# Patient Record
Sex: FEMALE | Race: WHITE | Employment: FULL TIME | URBAN - METROPOLITAN AREA
[De-identification: names, ages, dates, MRNs, and addresses within clinical notes are randomized per-mention and may not be internally consistent; named-entity substitution may affect disease eponyms.]

---

## 2018-02-15 LAB
ANTIBODY SCREEN, EXTERNAL: NEGATIVE
CHLAMYDIA, EXTERNAL: NEGATIVE
HBSAG, EXTERNAL: NEGATIVE
HIV, EXTERNAL: NON REACTIVE
N. GONORRHEA, EXTERNAL: NEGATIVE
RUBELLA, EXTERNAL: NORMAL
TYPE, ABO & RH, EXTERNAL: NORMAL

## 2018-07-06 LAB — T. PALLIDUM, EXTERNAL: NEGATIVE

## 2018-08-27 LAB — GRBS, EXTERNAL: POSITIVE

## 2018-09-22 ENCOUNTER — HOSPITAL ENCOUNTER (INPATIENT)
Age: 33
LOS: 2 days | Discharge: HOME | End: 2018-09-24
Attending: OBSTETRICS & GYNECOLOGY | Admitting: OBSTETRICS & GYNECOLOGY
Payer: COMMERCIAL

## 2018-09-22 ENCOUNTER — ANESTHESIA (OUTPATIENT)
Dept: LABOR AND DELIVERY | Age: 33
End: 2018-09-22
Payer: COMMERCIAL

## 2018-09-22 ENCOUNTER — ANESTHESIA EVENT (OUTPATIENT)
Dept: LABOR AND DELIVERY | Age: 33
End: 2018-09-22
Payer: COMMERCIAL

## 2018-09-22 PROBLEM — Z37.9 NORMAL LABOR: Status: ACTIVE | Noted: 2018-09-22

## 2018-09-22 LAB
ERYTHROCYTE [DISTWIDTH] IN BLOOD BY AUTOMATED COUNT: 13.5 % (ref 11.5–14.5)
HCT VFR BLD AUTO: 33.1 % (ref 35–47)
HGB BLD-MCNC: 11.1 G/DL (ref 11.5–16)
MCH RBC QN AUTO: 30 PG (ref 26–34)
MCHC RBC AUTO-ENTMCNC: 33.5 G/DL (ref 30–36.5)
MCV RBC AUTO: 89.5 FL (ref 80–99)
NRBC # BLD: 0 K/UL (ref 0–0.01)
NRBC BLD-RTO: 0 PER 100 WBC
PLATELET # BLD AUTO: 314 K/UL (ref 150–400)
PMV BLD AUTO: 9.8 FL (ref 8.9–12.9)
RBC # BLD AUTO: 3.7 M/UL (ref 3.8–5.2)
WBC # BLD AUTO: 15.3 K/UL (ref 3.6–11)

## 2018-09-22 PROCEDURE — 65410000002 HC RM PRIVATE OB

## 2018-09-22 PROCEDURE — 77030011943

## 2018-09-22 PROCEDURE — 75410000000 HC DELIVERY VAGINAL/SINGLE

## 2018-09-22 PROCEDURE — 74011000250 HC RX REV CODE- 250

## 2018-09-22 PROCEDURE — 74011250636 HC RX REV CODE- 250/636: Performed by: ANESTHESIOLOGY

## 2018-09-22 PROCEDURE — 36415 COLL VENOUS BLD VENIPUNCTURE: CPT | Performed by: OBSTETRICS & GYNECOLOGY

## 2018-09-22 PROCEDURE — 10907ZC DRAINAGE OF AMNIOTIC FLUID, THERAPEUTIC FROM PRODUCTS OF CONCEPTION, VIA NATURAL OR ARTIFICIAL OPENING: ICD-10-PCS | Performed by: ADVANCED PRACTICE MIDWIFE

## 2018-09-22 PROCEDURE — 74011250636 HC RX REV CODE- 250/636

## 2018-09-22 PROCEDURE — 99283 EMERGENCY DEPT VISIT LOW MDM: CPT

## 2018-09-22 PROCEDURE — 75410000003 HC RECOV DEL/VAG/CSECN EA 0.5 HR

## 2018-09-22 PROCEDURE — 85027 COMPLETE CBC AUTOMATED: CPT | Performed by: OBSTETRICS & GYNECOLOGY

## 2018-09-22 PROCEDURE — 74011250636 HC RX REV CODE- 250/636: Performed by: OBSTETRICS & GYNECOLOGY

## 2018-09-22 PROCEDURE — 74011000258 HC RX REV CODE- 258: Performed by: OBSTETRICS & GYNECOLOGY

## 2018-09-22 PROCEDURE — 51701 INSERT BLADDER CATHETER: CPT

## 2018-09-22 PROCEDURE — 74011250637 HC RX REV CODE- 250/637: Performed by: ADVANCED PRACTICE MIDWIFE

## 2018-09-22 PROCEDURE — 76060000078 HC EPIDURAL ANESTHESIA

## 2018-09-22 PROCEDURE — A4300 CATH IMPL VASC ACCESS PORTAL: HCPCS

## 2018-09-22 PROCEDURE — 77030014125 HC TY EPDRL BBMI -B: Performed by: ANESTHESIOLOGY

## 2018-09-22 PROCEDURE — 75410000002 HC LABOR FEE PER 1 HR

## 2018-09-22 RX ORDER — MINERAL OIL
OIL (ML) ORAL
Status: DISPENSED
Start: 2018-09-22 | End: 2018-09-23

## 2018-09-22 RX ORDER — BUPIVACAINE HYDROCHLORIDE 5 MG/ML
INJECTION, SOLUTION EPIDURAL; INTRACAUDAL AS NEEDED
Status: DISCONTINUED | OUTPATIENT
Start: 2018-09-22 | End: 2018-09-22 | Stop reason: HOSPADM

## 2018-09-22 RX ORDER — FENTANYL/BUPIVACAINE/NS/PF 2-1250MCG
1-16 PREFILLED PUMP RESERVOIR EPIDURAL CONTINUOUS
Status: DISCONTINUED | OUTPATIENT
Start: 2018-09-22 | End: 2018-09-24 | Stop reason: HOSPADM

## 2018-09-22 RX ORDER — EPHEDRINE SULFATE 50 MG/ML
10 INJECTION, SOLUTION INTRAVENOUS
Status: DISCONTINUED | OUTPATIENT
Start: 2018-09-22 | End: 2018-09-22 | Stop reason: HOSPADM

## 2018-09-22 RX ORDER — ONDANSETRON 2 MG/ML
4 INJECTION INTRAMUSCULAR; INTRAVENOUS
Status: DISCONTINUED | OUTPATIENT
Start: 2018-09-22 | End: 2018-09-22 | Stop reason: HOSPADM

## 2018-09-22 RX ORDER — IBUPROFEN 400 MG/1
800 TABLET ORAL EVERY 8 HOURS
Status: DISCONTINUED | OUTPATIENT
Start: 2018-09-22 | End: 2018-09-24 | Stop reason: HOSPADM

## 2018-09-22 RX ORDER — LIDOCAINE HYDROCHLORIDE AND EPINEPHRINE 15; 5 MG/ML; UG/ML
INJECTION, SOLUTION EPIDURAL AS NEEDED
Status: DISCONTINUED | OUTPATIENT
Start: 2018-09-22 | End: 2018-09-22 | Stop reason: HOSPADM

## 2018-09-22 RX ORDER — TERBUTALINE SULFATE 1 MG/ML
0.25 INJECTION SUBCUTANEOUS AS NEEDED
Status: DISCONTINUED | OUTPATIENT
Start: 2018-09-22 | End: 2018-09-22 | Stop reason: HOSPADM

## 2018-09-22 RX ORDER — BUPIVACAINE HYDROCHLORIDE 5 MG/ML
30 INJECTION, SOLUTION EPIDURAL; INTRACAUDAL AS NEEDED
Status: DISCONTINUED | OUTPATIENT
Start: 2018-09-22 | End: 2018-09-22 | Stop reason: HOSPADM

## 2018-09-22 RX ORDER — SODIUM CHLORIDE, SODIUM LACTATE, POTASSIUM CHLORIDE, CALCIUM CHLORIDE 600; 310; 30; 20 MG/100ML; MG/100ML; MG/100ML; MG/100ML
125 INJECTION, SOLUTION INTRAVENOUS CONTINUOUS
Status: DISCONTINUED | OUTPATIENT
Start: 2018-09-22 | End: 2018-09-24 | Stop reason: HOSPADM

## 2018-09-22 RX ORDER — FENTANYL CITRATE 50 UG/ML
INJECTION, SOLUTION INTRAMUSCULAR; INTRAVENOUS AS NEEDED
Status: DISCONTINUED | OUTPATIENT
Start: 2018-09-22 | End: 2018-09-22 | Stop reason: HOSPADM

## 2018-09-22 RX ORDER — FENTANYL CITRATE 50 UG/ML
100 INJECTION, SOLUTION INTRAMUSCULAR; INTRAVENOUS ONCE
Status: DISCONTINUED | OUTPATIENT
Start: 2018-09-22 | End: 2018-09-22 | Stop reason: HOSPADM

## 2018-09-22 RX ORDER — NALBUPHINE HYDROCHLORIDE 10 MG/ML
10 INJECTION, SOLUTION INTRAMUSCULAR; INTRAVENOUS; SUBCUTANEOUS
Status: DISCONTINUED | OUTPATIENT
Start: 2018-09-22 | End: 2018-09-22 | Stop reason: HOSPADM

## 2018-09-22 RX ORDER — NALOXONE HYDROCHLORIDE 0.4 MG/ML
0.4 INJECTION, SOLUTION INTRAMUSCULAR; INTRAVENOUS; SUBCUTANEOUS AS NEEDED
Status: DISCONTINUED | OUTPATIENT
Start: 2018-09-22 | End: 2018-09-22 | Stop reason: HOSPADM

## 2018-09-22 RX ORDER — SODIUM CHLORIDE 0.9 % (FLUSH) 0.9 %
SYRINGE (ML) INJECTION
Status: COMPLETED
Start: 2018-09-22 | End: 2018-09-22

## 2018-09-22 RX ORDER — OXYTOCIN/0.9 % SODIUM CHLORIDE 30/500 ML
PLASTIC BAG, INJECTION (ML) INTRAVENOUS
Status: COMPLETED
Start: 2018-09-22 | End: 2018-09-22

## 2018-09-22 RX ADMIN — OXYTOCIN: 10 INJECTION, SOLUTION INTRAMUSCULAR; INTRAVENOUS at 17:10

## 2018-09-22 RX ADMIN — Medication 10 ML/HR: at 13:38

## 2018-09-22 RX ADMIN — AMPICILLIN 2 G: 2 INJECTION, POWDER, FOR SOLUTION INTRAVENOUS at 12:50

## 2018-09-22 RX ADMIN — Medication 10 ML: at 19:25

## 2018-09-22 RX ADMIN — AMPICILLIN SODIUM 1 G: 1 INJECTION, POWDER, FOR SOLUTION INTRAMUSCULAR; INTRAVENOUS at 16:49

## 2018-09-22 RX ADMIN — IBUPROFEN 800 MG: 400 TABLET ORAL at 20:09

## 2018-09-22 RX ADMIN — BUPIVACAINE HYDROCHLORIDE 2 ML: 5 INJECTION, SOLUTION EPIDURAL; INTRACAUDAL at 13:36

## 2018-09-22 RX ADMIN — FENTANYL CITRATE 100 MCG: 50 INJECTION, SOLUTION INTRAMUSCULAR; INTRAVENOUS at 13:36

## 2018-09-22 RX ADMIN — SODIUM CHLORIDE, SODIUM LACTATE, POTASSIUM CHLORIDE, AND CALCIUM CHLORIDE 125 ML/HR: 600; 310; 30; 20 INJECTION, SOLUTION INTRAVENOUS at 13:25

## 2018-09-22 RX ADMIN — SODIUM CHLORIDE, SODIUM LACTATE, POTASSIUM CHLORIDE, AND CALCIUM CHLORIDE 999 ML/HR: 600; 310; 30; 20 INJECTION, SOLUTION INTRAVENOUS at 12:35

## 2018-09-22 RX ADMIN — LIDOCAINE HYDROCHLORIDE AND EPINEPHRINE 5 ML: 15; 5 INJECTION, SOLUTION EPIDURAL at 13:36

## 2018-09-22 NOTE — PROGRESS NOTES
1130  41 week pt of Dr Regi Ham and Leslie Navarro CNM for Dr Ricardo Franklin received to labor and delivery # 9 to rule out labor. Pt states that she started ana around 0900. Denies ROM or bloody show. Denies pregnancy complications but states she is GBS positive. Reports being 4cm in office yesterday. 1200 Dr Regi Ham notified of patient's arrival, contraction pattern and reactive fh trancing. Nurse to do vaginal exam. Pt will be under the care on [address] [place] [id number] 4.5/80/-1 
202 [facility] in to evaluate patient. To admit, start IV antibiotics and get epidural PRN 
1235 IV and labs. Bolus initiated for epidural 
1250 IV ampicillin 1233 Epidural by Dr Isaias Fuentes 1505 SVE by [address] 5/80/-1 ARM. Small amount of clear fluid noted. 1550 Straight cathed for 450 ml clear yellow urine 1645 Feeling intense rectal vaginal pressure. SVE 9/100/+1 
1655 Feeling intense urge to push. Instructed in pant blow breathing. Nurse Midwife called to bedside [address] at bedside. Pt in semifowlers starting to push. 1705  of LMI 1940 Bedside and Verbal shift change report given to CHARLES Sahni RN (oncoming nurse) by Yaa Phelps RN (offgoing nurse). Report included the following information SBAR, Kardex, Procedure Summary, Accordion and Recent Results.

## 2018-09-22 NOTE — L&D DELIVERY NOTE
Delivery Note    Obstetrician:  Umm Rivera CNM    Assistant: none    Pre-Delivery Diagnosis: Term pregnancy, Spontaneous labor, Single fetus and Uncomplicated pregnancy    Post-Delivery Diagnosis: Living  infant(s) and Male    Intrapartum Event: None    Procedure: Spontaneous vaginal delivery    Epidural: YES    Monitor:  Fetal Heart Tones - External and Uterine Contractions - External    Indications for instrumental delivery: none    Estimated Blood Loss: 150ml    Episiotomy: n/a    Laceration(s):  none    Laceration(s) repair: NO    Presentation: Cephalic    Fetal Description: thomason    Fetal Position: Occiput Anterior    Birth Weight: pending    Birth Length: pending    Apgar - One Minute: 8    Apgar - Five Minutes: 9    Umbilical Cord: Nuchal Cord x  1 and 3 vessels present    Specimens: none           Complications:  none           Cord Blood Results:   Information for the patient's :  Preethi Medrano [[id number]]   No results found for: PCTABR, PCTDIG, BILI, ABORH    Prenatal Labs:     Lab Results   Component Value Date/Time    ABO,Rh A+ 2015    HBsAg, External neg 2015    HIV, External non reactive 2015    Rubella, External immune 2015    GrBStrep, External pos 2015        Notes:  Term IUP delivery via  over intact perineum. Infant delivered in OA position and restituted to MONTRELL. Nuchal cord x1 noted, delivered through. Shoulders and body easily followed on next push. Infant placed on maternal abdomen, dried and stimulated. Spontaneous cry noted. Cord clamped and cut by FOB after pulsations ceased. Placenta delivered spontaneously, complete and intact with 3VC. Fundus firm at umbilicus, midline. Hemestasis maintained. EBL 150ml. Vagina and perineum inspected and found to be intact. Mother and baby stable. Skin to skin and BF encouraged.    Signed By:  Umm Rivera CNM     2018

## 2018-09-22 NOTE — ANESTHESIA PREPROCEDURE EVALUATION
Anesthetic History No history of anesthetic complications Review of Systems / Medical History Patient summary reviewed, nursing notes reviewed and pertinent labs reviewed Pulmonary Within defined limits Neuro/Psych Within defined limits Cardiovascular Within defined limits GI/Hepatic/Renal 
Within defined limits Endo/Other Within defined limits Other Findings Physical Exam 
 
Airway Mallampati: II 
TM Distance: > 6 cm Neck ROM: normal range of motion Mouth opening: Normal 
 
 Cardiovascular Regular rate and rhythm,  S1 and S2 normal,  no murmur, click, rub, or gallop Dental 
No notable dental hx Pulmonary Breath sounds clear to auscultation Abdominal 
GI exam deferred Other Findings Anesthetic Plan ASA: 1 Anesthesia type: epidural 
 
 
 
 
Induction: Intravenous Anesthetic plan and risks discussed with: Patient

## 2018-09-22 NOTE — IP AVS SNAPSHOT
[address] [facility] [address] [place] [address] 
[phone] Patient: Jami Llanes MRN: [id number] :1985 About your hospitalization You were admitted on:  2018 You last received care in the:  [address] [facility] You were discharged on:  2018 Why you were hospitalized Your primary diagnosis was:  Not on File Your diagnoses also included:  Normal Labor Follow-up Information Follow up With Details Comments Contact Info Selene Evans MD   [facility] [address] 
[phone] Russell Wong MD Schedule an appointment as soon as possible for a visit in 6 weeks Postpartum Follow-Up [id number] [place] [address] 7 [phone] Discharge Orders None A check oneil indicates which time of day the medication should be taken. My Medications START taking these medications Instructions Each Dose to Equal  
 Morning Noon Evening Bedtime  
 ibuprofen 800 mg tablet Commonly known as:  MOTRIN Your last dose was: Your next dose is: Take 1 Tab by mouth every eight (8) hours as needed for Pain. 800 mg CONTINUE taking these medications Instructions Each Dose to Equal  
 Morning Noon Evening Bedtime  
 [id number] nitesh-folic-dha -301 mg-mcg-mg Cmpk Your last dose was: Your next dose is: Take  by mouth. Indications: PREGNANCY  
     
   
   
   
  
 valACYclovir 500 mg tablet Commonly known as:  VALTREX Your last dose was: Your next dose is: Take 500 mg by mouth two (2) times a day. Indications: SUPPRESSION OF RECURRENT HERPES SIMPLEX INFECTION  
 500 mg Where to Get Your Medications Information on where to get these meds will be given to you by the nurse or doctor. ! Ask your nurse or doctor about these medications  
  ibuprofen 800 mg tablet Discharge Instructions POSTPARTUM DISCHARGE INSTRUCTIONS Name:  [facility] YOB: 1985 Admission Diagnosis:  Normal labor Discharge Diagnosis:   
Problem List as of 9/24/2018  Date Reviewed: 1/7/2016 Codes Class Noted - Resolved RESOLVED: Normal labor ICD-10-CM: O80, Z37.9 ICD-9-CM: 799  9/22/2018 - 9/24/2018 RESOLVED: Normal delivery ICD-10-CM: O80, Z37.9 ICD-9-CM: 494  1/7/2016 - 1/8/2016 RESOLVED: Normal pregnancy in third trimester ICD-10-CM: Z34.93 
ICD-9-CM: V22.2  1/6/2016 - 1/8/2016 Attending Physician:  Srini Jeffery MD 
 
Delivery Type:  Vaginal Childbirth: What To Expect At Home Your Recovery: Your body will slowly heal in the next few weeks. It is easy to get too tired and overwhelmed during the first weeks after your baby is born. Changes in your hormones can shift your mood without warning. You may find it hard to meet the extra demands on your energy and time. Take it easy on yourself. Follow-up care is a key part of your treatment and safety. Be sure to make and go to all appointments, and call your doctor if you are having problems. It's also a good idea to know your test results and keep a list of the medicines you take. How can you care for yourself at home? Vaginal bleeding and cramps · After delivery, you will have a bloody discharge from the vagina. This will turn pink within a week and then white or yellow after about 10 days. It may last for 2 to 4 weeks or longer, until the uterus has healed. Use pads instead of tampons until you stop bleeding. · Do not worry if you pass some blood clots, as long as they are smaller than a golf ball. If you have a tear or stitches in your vaginal area, change the pad at least every 4 hours to prevent soreness and infection. · You may have cramps for the first few days after childbirth. These are normal and occur as the uterus shrinks to normal size. Take an over-the-counter pain medicine, such as acetaminophen (Tylenol), ibuprofen (Advil, Motrin), or naproxen (Aleve), for cramps. Read and follow all instructions on the label. Do not take aspirin, because it can cause more bleeding. Do not take acetaminophen (Tylenol) and other acetaminophen containing medications (i.e. Percocet) at the same time. Breast fullness · Your breasts may overfill (engorge) in the first few days after delivery. To help milk flow and to relieve pain, warm your breasts in the shower or by using warm, moist towels before nursing. · If you are not nursing, do not put warmth on your breasts or touch your breasts. Wear a tight bra or sports bra and use ice until the fullness goes away. This usually takes 2 to 3 days. · Put ice or a cold pack on your breast after nursing to reduce swelling and pain. Put a thin cloth between the ice and your skin. Activity · Eat a balanced diet. Do not try to lose weight by cutting calories. Keep taking your prenatal vitamins, or take a multivitamin. · Get as much rest as you can. Try to take naps when your baby sleeps during the day. · Get some exercise every day. But do not do any heavy exercise until your doctor says it is okay. · Wait until you are healed (about 4 to 6 weeks) before you have sexual intercourse. Your doctor will tell you when it is okay to have sex. · Talk to your doctor about birth control. You can get pregnant even before your period returns. Also, you can get pregnant while you are breast-feeding. Mental Health · Many women get the \"baby blues\" during the first few days after childbirth. You may lose sleep, feel irritable, and cry easily. You may feel happy one minute and sad the next. Hormone changes are one cause of these emotional changes.  Also, the demands of a new baby, along with visits from relatives or other family needs, add to a mother's stress. The \"baby blues\" often peak around the fourth day. Then they ease up in less than 2 weeks. · If your moodiness or anxiety lasts for more than 2 weeks, or if you feel like life is not worth living, you may have postpartum depression. This is different for each mother. Some mothers with serious depression may worry intensely about their infant's well-being. Others may feel distant from their child. Some mothers might even feel that they might harm their baby. A mother may have signs of paranoia, wondering if someone is watching her. · With all the changes in your life, you may not know if you are depressed. Pregnancy sometimes causes changes in how you feel that are similar to the symptoms of depression. · Symptoms of depression include: · Feeling sad or hopeless and losing interest in daily activities. These are the most common symptoms of depression. · Sleeping too much or not enough. · Feeling tired. You may feel as if you have no energy. · Eating too much or too little. · POSTPARTUM SUPPORT INTERNATIONAL (PSI) offers a Warm line; Chat with the Expert phone sessions; Information and Articles about Pregnancy and Postpartum Mood Disorders; Comprehensive List of Free Support Groups; Knowledgeable local coordinators who will offer support, information, and resources; Guide to Resources on [employer]; Calendar of events in the  mood disorders community; Latest News and Research; and [facility] [address] for [employer]. Remember - You are not alone; You are not to blame; With help, you will be well. 0-714-595-PPD(1463). WWW. POSTPARTUM. NET · Writing or talking about death, such as writing suicide notes or talking about guns, knives, or pills. Keep the numbers for these national suicide hotlines: 7-203-270-TALK (3-210.190.2129) and 1-464-NREVQOZ (8-136.153.5382).  If you or someone you know talks about suicide or feeling hopeless, get help right away. Constipation and Hemorrhoids · Drink plenty of fluids, enough so that your urine is light yellow or clear like water. If you have kidney, heart, or liver disease and have to limit fluids, talk with your doctor before you increase the amount of fluids you drink. · Eat plenty of fiber each day. Have a bran muffin or bran cereal for breakfast, and try eating a piece of fruit for a mid-afternoon snack. · For painful, itchy hemorrhoids, put ice or a cold pack on the area several times a day for 10 minutes at a time. Follow this by putting a warm compress on the area for another 10 to 20 minutes or by sitting in a shallow, warm bath. When should you call for help? Call 911 anytime you think you may need emergency care. For example, call if: 
· You are thinking of hurting yourself, your baby, or anyone else. · You passed out (lost consciousness). · You have symptoms of a blood clot in your lung (called a pulmonary embolism). These may include:   
· Sudden chest pain. · Trouble breathing. · Coughing up blood. Call your doctor now or seek immediate medical care if: 
· You have severe vaginal bleeding. · You are soaking through a pad each hour for 2 or more hours. · Your vaginal bleeding seems to be getting heavier or is still bright red 4 days after delivery. · You are dizzy or lightheaded, or you feel like you may faint. · You are vomiting or cannot keep fluids down. · You have a fever. · You have new or more belly pain. · You pass tissue (not just blood). · Your vaginal discharge smells bad. · Your belly feels tender or full and hard. · Your breasts are continuously painful or red. · You feel sad, anxious, or hopeless for more than a few days. · You have sudden, severe pain in your belly. · You have symptoms of a blood clot in your leg (called a deep vein thrombosis),  
       such as: 
· Pain in your calf, back of the knee, thigh, or groin. · Redness and swelling in your leg or groin. · You have symptoms of preeclampsia, such as: 
· Sudden swelling of your face, hands, or feet. · New vision problems (such as dimness or blurring). · A severe headache. · Your blood pressure is higher than it should be or rises suddenly. · You have new nausea or vomiting. Watch closely for changes in your health, and be sure to contact your doctor if you have any problems. Additional Information:  Not applicable These are general instructions for a healthy lifestyle: No smoking/ No tobacco products/ Avoid exposure to second hand smoke Surgeon General's Warning:  Quitting smoking now greatly reduces serious risk to your health. Obesity, smoking, and sedentary lifestyle greatly increases your risk for illness A healthy diet, regular physical exercise & weight monitoring are important for maintaining a healthy lifestyle Recognize signs and symptoms of STROKE: 
 
F-face looks uneven A-arms unable to move or move unevenly S-speech slurred or non-existent T-time-call 911 as soon as signs and symptoms begin - DO NOT go  
    back to bed or wait to see if you get better - TIME IS BRAIN. I have had the opportunity to make my options or choices for discharge. I have received and understand these instructions. After Your Delivery (the Postpartum Period): Care Instructions Your Care Instructions Congratulations on the birth of your baby. Like pregnancy, the  period can be a time of excitement, agnieszka, and exhaustion. You may look at your wondrous little baby and feel happy. You may also be overwhelmed by your new sleep hours and new responsibilities. At first, babies often sleep during the days and are awake at night. They do not have a pattern or routine. They may make sudden gasps, jerk themselves awake, or look like they have crossed eyes. These are all normal, and they may even make you smile. In these first weeks after delivery, try to take good care of yourself. It may take 4 to 6 weeks to feel like yourself again, and possibly longer if you had a  birth. You will likely feel very tired for several weeks. Your days will be full of ups and downs, but lots of agnieszka as well. Follow-up care is a key part of your treatment and safety. Be sure to make and go to all appointments, and call your doctor if you are having problems. It's also a good idea to know your test results and keep a list of the medicines you take. How can you care for yourself at home? Take care of your body after delivery · Use pads instead of tampons for the bloody flow that may last as long as 2 weeks. · Ease cramps with ibuprofen (Advil, Motrin). · Ease soreness of hemorrhoids and the area between your vagina and rectum with ice compresses or witch hazel pads. · Ease constipation by drinking lots of fluid and eating high-fiber foods. Ask your doctor about over-the-counter stool softeners. · Cleanse yourself with a gentle squeeze of warm water from a bottle instead of wiping with toilet paper. · Take a sitz bath in warm water several times a day. · Wear a good nursing bra. Ease sore and swollen breasts with warm, wet washcloths. · If you are not breastfeeding, use ice rather than heat for breast soreness. · Your period may not start for several months if you are breastfeeding. You may bleed more, and longer at first, than you did before you got pregnant. · Wait until you are healed (about 4 to 6 weeks) before you have sexual intercourse. Your doctor will tell you when it is okay to have sex. · Try not to travel with your baby for 5 or 6 weeks. If you take a long car trip, make frequent stops to walk around and stretch. Avoid exhaustion · Rest every day. Try to nap when your baby naps. · Ask another adult to be with you for a few days after delivery. · Plan for  if you have other children. · Stay flexible so you can eat at odd hours and sleep when you need to. Both you and your baby are making new schedules. · Plan small trips to get out of the house. Change can make you feel less tired. · Ask for help with housework, cooking, and shopping. Remind yourself that your job is to care for your baby. Know about help for postpartum depression · \"Baby blues\" are common for the first 1 to 2 weeks after birth. You may cry or feel sad or irritable for no reason. · Rest whenever you can. Being tired makes it harder to handle your emotions. · Go for walks with your baby. · Talk to your partner, friends, and family about your feelings. · If your symptoms last for more than a few weeks, or if you feel very depressed, ask your doctor for help. · Postpartum depression can be treated. Support groups and counseling can help. Sometimes medicine can also help. Stay healthy · Eat healthy foods so you have more energy, make good breast milk, and lose extra baby pounds. · If you breastfeed, avoid alcohol and drugs. If you quit smoking during pregnancy, try to stay smoke-free. · Start daily exercise after 4 to 6 weeks, but rest when you feel tired. · Learn exercises to tone your belly. Do Kegel exercises to regain strength in your pelvic muscles. You can do these exercises while you stand or sit. ¨ Squeeze the same muscles you would use to stop your urine. Your belly and thighs should not move. ¨ Hold the squeeze for 3 seconds, and then relax for 3 seconds. ¨ Start with 3 seconds. Then add 1 second each week until you are able to squeeze for 10 seconds. ¨ Repeat the exercise 10 to 15 times for each session. Do three or more sessions each day. · Find a class for new mothers and new babies that has an exercise time. · If you had a  birth, give yourself a bit more time before you exercise, and be careful. When should you call for help? Call 911 anytime you think you may need emergency care. For example, call if: 
  · You passed out (lost consciousness).  
 Call your doctor now or seek immediate medical care if: 
  · You have severe vaginal bleeding. This means you are passing blood clots and soaking through a pad each hour for 2 or more hours.  
  · You are dizzy or lightheaded, or you feel like you may faint.  
  · You have a fever.  
  · You have new belly pain, or your pain gets worse.  
 Watch closely for changes in your health, and be sure to contact your doctor if: 
  · Your vaginal bleeding seems to be getting heavier.  
  · You have new or worse vaginal discharge.  
  · You feel sad, anxious, or hopeless for more than a few days.  
  · You do not get better as expected. Where can you learn more? Go to http://warren-malou.info/. Enter A461 in the search box to learn more about \"After Your Delivery (the Postpartum Period): Care Instructions. \" Current as of: November 21, 2017 Content Version: 11.7 © 6604-5383 Peak. Care instructions adapted under license by [employer] (which disclaims liability or warranty for this information). If you have questions about a medical condition or this instruction, always ask your healthcare professional. [address] any warranty or liability for your use of this information. Seventymm Announcement We are excited to announce that we are making your provider's discharge notes available to you in Seventymm. You will see these notes when they are completed and signed by the physician that discharged you from your recent hospital stay. If you have any questions or concerns about any information you see in Seventymm, please call the Health Information Department where you were seen or reach out to your Primary Care Provider for more information about your plan of care. Introducing [facility]! Maggy Spann introduces [employer] patient portal. Now you can access parts of your medical record, email your doctor's office, and request medication refills online. 1. In your internet browser, go to [email or web address] 2. Click on the First Time User? Click Here link in the Sign In box. You will see the New Member Sign Up page. 3. Enter your [employer] Access Code exactly as it appears below. You will not need to use this code after youve completed the sign-up process. If you do not sign up before the expiration date, you must request a new code. · [employer] Access Code: [email or web address] Expires: 12/23/2018 10:49 AM 
 
4. Enter the last four digits of your Social Security Number (xxxx) and Date of Birth (mm/dd/yyyy) as indicated and click Submit. You will be taken to the next sign-up page. 5. Create a [employer] ID. This will be your [employer] login ID and cannot be changed, so think of one that is secure and easy to remember. 6. Create a [employer] password. You can change your password at any time. 7. Enter your Password Reset Question and Answer. This can be used at a later time if you forget your password. 8. Enter your e-mail address. You will receive e-mail notification when new information is available in [address] 9. Click Sign Up. You can now view and download portions of your medical record. 10. Click the Download Summary menu link to download a portable copy of your medical information. If you have questions, please visit the Frequently Asked Questions section of the [employer] website. Remember, [employer] is NOT to be used for urgent needs. For medical emergencies, dial 911. Now available from your iPhone and Android! Introducing Arnold Wilcox As a Maggy Spann patient, I wanted to make you aware of our electronic visit tool called Arnold Wilcox. Maggy Spann 24/7 allows you to connect within minutes with a medical provider 24 hours a day, seven days a week via a mobile device or tablet or logging into a secure website from your computer. You can access MediSens from anywhere in the [place]. A virtual visit might be right for you when you have a simple condition and feel like you just dont want to get out of bed, or cant get away from work for an appointment, when your regular [facility] provider is not available (evenings, weekends or holidays), or when youre out of town and need minor care. Electronic visits cost only $49 and if the [facility] provider determines a prescription is needed to treat your condition, one can be electronically transmitted to a nearby pharmacy*. Please take a moment to enroll today if you have not already done so. The enrollment process is free and takes just a few minutes. To enroll, please download the [employer] mando to your tablet or phone, or visit [email or web address] to enroll on your computer. And, as an [address] [place] [address] patient with a [employer] account, the results of your visits will be scanned into your electronic medical record and your primary care provider will be able to view the scanned results. We urge you to continue to see your regular [facility] provider for your ongoing medical care. And while your primary care provider may not be the one available when you seek a Arnold Wilcox virtual visit, the peace of mind you get from getting a real diagnosis real time can be priceless. For more information on Arnold GameOnmayrafin, view our Frequently Asked Questions (FAQs) at [email or web address]. Sincerely, 
 
Lorenzo Lockhart MD 
Chief Medical Officer Ty Espitia *:  certain medications cannot be prescribed via Arnold GameOnedmond Providers Seen During Your Hospitalization Provider Specialty Primary office phone Fanny French MD Obstetrics & Gynecology [phone] Immunizations Administered for This Admission Name Date Tdap  Deferred () Your Primary Care Physician (PCP) Primary Care Physician Office Phone Office Fax Austyn Carson [phone] You are allergic to the following No active allergies Recent Documentation Height Weight Breastfeeding? BMI OB Status Smoking Status 1.676 m 60.3 kg Unknown 21.47 kg/m2 Recent pregnancy Never Smoker Emergency Contacts Name Discharge Info Relation Home Work Mobile [address] CAREGIVER [3] Spouse [3] [phone] [address] CAREGIVER [3] Mother [14] [phone] Patient Belongings The following personal items are in your possession at time of discharge: 
     Visual Aid: None Please provide this summary of care documentation to your next provider. Signatures-by signing, you are acknowledging that this After Visit Summary has been reviewed with you and you have received a copy. Patient Signature:  ____________________________________________________________ Date:  ____________________________________________________________  
  
[facility] Provider Signature:  ____________________________________________________________ Date:  ____________________________________________________________

## 2018-09-22 NOTE — ANESTHESIA PROCEDURE NOTES
Epidural Block Start time: 9/22/2018 1:30 PM 
End time: 9/22/2018 1:40 PM 
Performed by: SONJA Donaldson Authorized by: SONJA Donaldson Pre-Procedure Indication: labor epidural   
Preanesthetic Checklist: patient identified, risks and benefits discussed, anesthesia consent, site marked, patient being monitored, timeout performed and anesthesia consent Timeout Time: 13:30 Epidural:  
Patient position:  Left lateral decubitus Prep region:  Lumbar Prep: Chlorhexidine Location:  L2-3 Needle and Epidural Catheter:  
Needle Type:  Tuohy Needle Gauge:  17 G Injection Technique:  Loss of resistance using air Attempts:  1 Catheter Size:  19 G Events: no blood with aspiration, no cerebrospinal fluid with aspiration, no paresthesia and negative aspiration test   
Test Dose:  Negative and lidocaine 1.5% w/ epi Assessment:  
Catheter Secured:  Tegaderm and tape Insertion:  Uncomplicated Patient tolerance:  Patient tolerated the procedure well with no immediate complications

## 2018-09-22 NOTE — IP AVS SNAPSHOT
[address] [facility] 
[phone] Patient: Hawa Ennis MRN: [id number] :1985 A check oneil indicates which time of day the medication should be taken. My Medications START taking these medications Instructions Each Dose to Equal  
 Morning Noon Evening Bedtime  
 ibuprofen 800 mg tablet Commonly known as:  MOTRIN Your last dose was: Your next dose is: Take 1 Tab by mouth every eight (8) hours as needed for Pain. 800 mg CONTINUE taking these medications Instructions Each Dose to Equal  
 Morning Noon Evening Bedtime  
 [id number] nitesh-folic-dha -619 mg-mcg-mg Cmpk Your last dose was: Your next dose is: Take  by mouth. Indications: PREGNANCY  
     
   
   
   
  
 valACYclovir 500 mg tablet Commonly known as:  VALTREX Your last dose was: Your next dose is: Take 500 mg by mouth two (2) times a day. Indications: SUPPRESSION OF RECURRENT HERPES SIMPLEX INFECTION  
 500 mg Where to Get Your Medications Information on where to get these meds will be given to you by the nurse or doctor. ! Ask your nurse or doctor about these medications  
  ibuprofen 800 mg tablet

## 2018-09-22 NOTE — PROGRESS NOTES
Labor and Delivery Admission Note 2018 Patient is a 29 y.o. G1 P 0 39w6d by  LMP. EFRA 18. Pt of Dr. Hermila Shabazz. Presnets with c/o painful UCs that started this morning. Hx  x1 in 667, uncomplicated. GBS+, Hx HSV+ on valtrex. No recent outbreaks. deneis all other sig med/ob hx. GFOB at bedside. Requesting epidural.  
 
PNC: Blood type: A 
          RH: pos Hep B: negative Rubella: immune GBS status: positive OBHX:  
OB History  Para Term  AB Living 2 1 1   1 SAB TAB Ectopic Molar Multiple Live Births 0 1 PMH:  
Past Medical History:  
Diagnosis Date  Genital herpes   
 last outbreak over a year ago, on valtrex  Herpes simplex virus (HSV) infection Not during pregnancy PSH:  
Past Surgical History:  
Procedure Laterality Date  HX OTHER SURGICAL  10/2014  
 wisdom teeth  
 
 
OB/GYN: ,  x1 Meds:  
Prior to Admission Medications Prescriptions Last Dose Informant Patient Reported? Taking? PNV no.24-iron-folic acid-dha -064 mg-mcg-mg cmpk 2018 at 0800  Yes Yes Sig: Take  by mouth. Indications: PREGNANCY  
valACYclovir (VALTREX) 500 mg tablet 2018 at 0800  Yes Yes Sig: Take 500 mg by mouth two (2) times a day. Indications: SUPPRESSION OF RECURRENT HERPES SIMPLEX INFECTION Facility-Administered Medications: None Allergies: No Known Allergies Pertinent ROS: Review of Systems - History obtained from the patient General ROS: negative Psychological ROS: negative Respiratory ROS: no cough, shortness of breath, or wheezing Cardiovascular ROS: no chest pain or dyspnea on exertion Genito-Urinary ROS: no dysuria, trouble voiding, or hematuria Neurological ROS: negative for - behavioral changes, dizziness, headaches or visual changes [address]:  
Family History Problem Relation Age of Onset  Heart Disease Maternal Grandfather  Diabetes Paternal Grandmother Lung  
 Elevated Lipids Paternal Grandmother  Heart Disease Paternal Grandfather 61 SH: Social History Social History  Marital status:  Spouse name: N/A  
 Number of children: N/A  
 Years of education: N/A Occupational History  Not on file. Social History Main Topics  Smoking status: Never Smoker  Smokeless tobacco: Never Used  Alcohol use Yes Comment: Not with pregnancy  Drug use: No  
 Sexual activity: Yes  
  Partners: Male Birth control/ protection: None Other Topics Concern  Not on file Social History Narrative OBJECTIVE: 
 
Temp (24hrs), Av.2 °F (36.8 °C), Min:98.2 °F (36.8 °C), Max:98.2 °F (36.8 °C) Visit Vitals  BP 94/58  Pulse (!) 103  Temp 98.2 °F (36.8 °C)  Resp 20  
 Ht 5' 6\" (1.676 m)  Wt 60.3 kg (133 lb)  SpO2 100%  Breastfeeding No  
 BMI 21.47 kg/m2 FHR baseline 120 moderate variability, +accels, no decels [place] : 3-4 min, moderate intensity, lasting 80 sec Exam: 
General: alert HEENT:  normal  
Lungs:  clear Cor:  RRR Abdomen:  Soft, non-tender Clinical EFW 2800gms Cervix:  5/50/-1 Fluid:  intact Pelvimetry:  AP-good Arch- adequate Sidewalls- adequate Pelvis feels adequate for fetus. Extremities:  normal, no edema Impression:  at 37w11d Reassuring FWB 
                      GBS+ Plan: admit IV hydration, ampicillin Epidural 
          Anticipate  Christa Suarez CNM 
2:24 PM

## 2018-09-22 NOTE — H&P
History and Physical 
 
Patient: Qiana Vega MRN: [id number]  SSN: [id number] YOB: 1985  Age: 29 y.o. Sex: female Subjective:  
  
Qiana Vega is a 29 y.o. female who presents at 39.6 wks with c/o UCs since this morning. EFRA 18 by LMP. Receiving prenatal care with Dr. Tj Lopez at [facility]. Hx HSV with no recent outbreaks. On valtrex since 36 wks. GBS+.  x1 0052, uncomplicated. Deneis all othe rsig med/ob hx. Past Medical History:  
Diagnosis Date  Genital herpes   
 last outbreak over a year ago, on valtrex  Herpes simplex virus (HSV) infection Not during pregnancy Past Surgical History:  
Procedure Laterality Date  HX OTHER SURGICAL  10/2014  
 wisdom teeth Family History Problem Relation Age of Onset  Heart Disease Maternal Grandfather  Diabetes Paternal Grandmother Lung  Elevated Lipids Paternal Grandmother  Heart Disease Paternal Grandfather 61 Social History Substance Use Topics  Smoking status: Never Smoker  Smokeless tobacco: Never Used  Alcohol use Yes Comment: Not with pregnancy Prior to Admission medications Medication Sig Start Date End Date Taking? Authorizing Provider  
valACYclovir (VALTREX) 500 mg tablet Take 500 mg by mouth two (2) times a day. Indications: SUPPRESSION OF RECURRENT HERPES SIMPLEX INFECTION   Yes Historical Provider PNV no.24-iron-folic acid-dha -880 mg-mcg-mg cmpk Take  by mouth. Indications: PREGNANCY   Yes Historical Provider No Known Allergies Review of Systems: 
Constitutional: negative Respiratory: negative Cardiovascular: negative Genitourinary:negative Neurological: negative Behavioral/Psychiatric: negative Objective:  
 
Vitals:  
 18 1343 18 1346 18 1347 18 1349 BP: 92/50 90/50  94/58 Pulse: 88 93  (!) 103 Resp:      
Temp:      
SpO2:   100% Weight:      
Height:      
  
 
Physical Exam: GENERAL: alert, cooperative, no distress, appears stated age LUNG: clear to auscultation bilaterally HEART: regular rate and rhythm, S1, S2 normal, no murmur, click, rub or gallop ABDOMEN: soft, non-tender. Bowel sounds normal. No masses,  no organomegaly EXTREMITIES:  extremities normal, atraumatic, no cyanosis or edema, no edema NEUROLOGIC: AOx3. Gait normal. Reflexes and motor strength normal and symmetric. Cranial nerves 2-12 and sensation grossly intact. PSYCHIATRIC: non focal 
 
Assessment:  
IUP 39.6 in active labor Reassuring FWB 
GBS+ Hospital Problems  Date Reviewed: 2016 Codes Class Noted POA Normal labor ICD-10-CM: O80, Z37.9 ICD-9-CM: 986  2018 Unknown Plan:  
Admit IV hydration, ampicillin Epidural 
Anticipate  Signed By: Julian Holt CNM 2018

## 2018-09-23 PROCEDURE — 65410000002 HC RM PRIVATE OB

## 2018-09-23 PROCEDURE — 74011250637 HC RX REV CODE- 250/637: Performed by: ADVANCED PRACTICE MIDWIFE

## 2018-09-23 RX ORDER — SODIUM CHLORIDE 0.9 % (FLUSH) 0.9 %
5-10 SYRINGE (ML) INJECTION EVERY 8 HOURS
Status: DISCONTINUED | OUTPATIENT
Start: 2018-09-23 | End: 2018-09-24 | Stop reason: HOSPADM

## 2018-09-23 RX ORDER — DIPHENHYDRAMINE HCL 25 MG
25 CAPSULE ORAL
Status: DISCONTINUED | OUTPATIENT
Start: 2018-09-23 | End: 2018-09-24 | Stop reason: HOSPADM

## 2018-09-23 RX ORDER — OXYTOCIN/RINGER'S LACTATE 20/1000 ML
125-1000 PLASTIC BAG, INJECTION (ML) INTRAVENOUS AS NEEDED
Status: DISCONTINUED | OUTPATIENT
Start: 2018-09-23 | End: 2018-09-24 | Stop reason: HOSPADM

## 2018-09-23 RX ORDER — ONDANSETRON 4 MG/1
4 TABLET, ORALLY DISINTEGRATING ORAL
Status: DISCONTINUED | OUTPATIENT
Start: 2018-09-23 | End: 2018-09-24 | Stop reason: HOSPADM

## 2018-09-23 RX ORDER — OXYCODONE AND ACETAMINOPHEN 5; 325 MG/1; MG/1
1 TABLET ORAL
Status: DISCONTINUED | OUTPATIENT
Start: 2018-09-23 | End: 2018-09-24 | Stop reason: HOSPADM

## 2018-09-23 RX ORDER — SIMETHICONE 80 MG
80 TABLET,CHEWABLE ORAL
Status: DISCONTINUED | OUTPATIENT
Start: 2018-09-23 | End: 2018-09-24 | Stop reason: HOSPADM

## 2018-09-23 RX ORDER — DOCUSATE SODIUM 100 MG/1
100 CAPSULE, LIQUID FILLED ORAL
Status: DISCONTINUED | OUTPATIENT
Start: 2018-09-23 | End: 2018-09-24 | Stop reason: HOSPADM

## 2018-09-23 RX ORDER — SODIUM CHLORIDE 0.9 % (FLUSH) 0.9 %
5-10 SYRINGE (ML) INJECTION AS NEEDED
Status: DISCONTINUED | OUTPATIENT
Start: 2018-09-23 | End: 2018-09-24 | Stop reason: HOSPADM

## 2018-09-23 RX ORDER — ACETAMINOPHEN 325 MG/1
650 TABLET ORAL
Status: DISCONTINUED | OUTPATIENT
Start: 2018-09-23 | End: 2018-09-24 | Stop reason: HOSPADM

## 2018-09-23 RX ORDER — AMMONIA 15 % (W/V)
1 AMPUL (EA) INHALATION AS NEEDED
Status: DISCONTINUED | OUTPATIENT
Start: 2018-09-23 | End: 2018-09-24 | Stop reason: HOSPADM

## 2018-09-23 RX ORDER — HYDROCORTISONE 1 %
CREAM (GRAM) TOPICAL AS NEEDED
Status: DISCONTINUED | OUTPATIENT
Start: 2018-09-23 | End: 2018-09-24 | Stop reason: HOSPADM

## 2018-09-23 RX ORDER — ZOLPIDEM TARTRATE 5 MG/1
5 TABLET ORAL
Status: DISCONTINUED | OUTPATIENT
Start: 2018-09-23 | End: 2018-09-24 | Stop reason: HOSPADM

## 2018-09-23 RX ORDER — HYDROCORTISONE ACETATE PRAMOXINE HCL 2.5; 1 G/100G; G/100G
CREAM TOPICAL AS NEEDED
Status: DISCONTINUED | OUTPATIENT
Start: 2018-09-23 | End: 2018-09-24 | Stop reason: HOSPADM

## 2018-09-23 RX ADMIN — IBUPROFEN 800 MG: 400 TABLET ORAL at 20:23

## 2018-09-23 RX ADMIN — OXYCODONE HYDROCHLORIDE AND ACETAMINOPHEN 1 TABLET: 5; 325 TABLET ORAL at 14:18

## 2018-09-23 RX ADMIN — IBUPROFEN 800 MG: 400 TABLET ORAL at 04:25

## 2018-09-23 RX ADMIN — IBUPROFEN 800 MG: 400 TABLET ORAL at 12:23

## 2018-09-23 NOTE — ROUTINE PROCESS
Bedside shift change report given to ROCÍO Delgado RN (oncoming nurse) by Jermain Goldberg RN (offgoing nurse). Report included the following information SBAR, Kardex, Intake/Output, MAR and Recent Results.

## 2018-09-23 NOTE — LACTATION NOTE
This note was copied from a baby's chart. Not seen at breast, mother declines [facility] consult, expresses confidence in ability to breastfeed independently. Mother reports Baby nursing well today,  deep latch obtained, mother is comfortable, baby feeding vigorously with rhythmic suck, swallow, breathe pattern, audible swallowing, and evident milk transfer, both breasts offered, baby is asleep following feeding.

## 2018-09-23 NOTE — PROGRESS NOTES
1130 Bedside and Verbal shift change report given to TIM gatica RN (oncoming nurse) by Marty Caldwell RN (offgoing nurse). Report included the following information SBAR, Kardex, MAR and Recent Results. 1420 Verbal shift change report given to Marty Caldwell RN (oncoming nurse) by Marshall Blanton RN (offgoing nurse). Report included the following information SBAR, Kardex, Procedure Summary, MAR and Recent Results.

## 2018-09-23 NOTE — ROUTINE PROCESS
2020:  TRANSFER - IN REPORT: 
 
Verbal report received from MATT Sahni RN(name) on Duncan Jiménez  being received from L&D(unit) for routine progression of care Report consisted of patients Situation, Background, Assessment and  
Recommendations(SBAR). Information from the following report(s) SBAR was reviewed with the receiving nurse. Opportunity for questions and clarification was provided. Assessment completed upon patients arrival to unit and care assumed.

## 2018-09-23 NOTE — PROGRESS NOTES
Post-Partum Day Number 1 Progress Note [facility] Assessment: Doing well, post partum day 1 Plan: 1. Continue routine postpartum and perineal care as well as maternal education. 2. N/A -no circ Information for the patient's :  Margaret Emmanuel [[id number]] Vaginal, Spontaneous Delivery Patient doing well without significant complaint. Voiding without difficulty, normal lochia. Vitals: 
Visit Vitals  /69 (BP 1 Location: Left arm, BP Patient Position: At rest)  Pulse 91  Temp 97.7 °F (36.5 °C)  Resp 16  
 Ht 5' 6\" (1.676 m)  Wt 60.3 kg (133 lb)  SpO2 100%  Breastfeeding No  
 BMI 21.47 kg/m2 Temp (24hrs), Av.8 °F (36.6 °C), Min:97.4 °F (36.3 °C), Max:98.3 °F (36.8 °C) Exam:   Patient without distress. Abdomen soft, fundus firm, nontender Perineum with normal lochia noted. Lower extremities are negative for swelling, cords or tenderness. Labs:  
 
Lab Results Component Value Date/Time WBC 15.3 (H) 2018 12:55 PM  
 WBC 13.6 (H) 2016 06:40 AM  
 HGB 11.1 (L) 2018 12:55 PM  
 HGB 12.9 2016 06:40 AM  
 HCT 33.1 (L) 2018 12:55 PM  
 HCT 38.0 2016 06:40 AM  
 PLATELET 983  12:55 PM  
 PLATELET 800  06:40 AM  
 
 
No results found for this or any previous visit (from the past 24 hour(s)).

## 2018-09-24 VITALS
SYSTOLIC BLOOD PRESSURE: 106 MMHG | HEIGHT: 66 IN | HEART RATE: 87 BPM | OXYGEN SATURATION: 100 % | RESPIRATION RATE: 16 BRPM | WEIGHT: 133 LBS | TEMPERATURE: 98.3 F | BODY MASS INDEX: 21.38 KG/M2 | DIASTOLIC BLOOD PRESSURE: 66 MMHG

## 2018-09-24 PROBLEM — Z37.9 NORMAL LABOR: Status: RESOLVED | Noted: 2018-09-22 | Resolved: 2018-09-24

## 2018-09-24 PROCEDURE — 74011250637 HC RX REV CODE- 250/637: Performed by: ADVANCED PRACTICE MIDWIFE

## 2018-09-24 RX ORDER — IBUPROFEN 800 MG/1
800 TABLET ORAL
Qty: 60 TAB | Refills: 0 | Status: SHIPPED | OUTPATIENT
Start: 2018-09-24 | End: 2020-07-29

## 2018-09-24 RX ADMIN — IBUPROFEN 800 MG: 400 TABLET ORAL at 04:44

## 2018-09-24 RX ADMIN — IBUPROFEN 800 MG: 400 TABLET ORAL at 12:49

## 2018-09-24 NOTE — LACTATION NOTE
This note was copied from a baby's chart. Couplet Interdisciplinary Rounds MATERNAL Daily Goal:  
 
Influenza screening completed: YES  Tdap screening completed: YES Rhogam Given:N/A 
MMR Given:N/A 
 
VTE Prophylaxis: Not indicated, per Provider order EPDS:   
 
 
 Patient Name: Krupa Abreu Diagnosis:  Single liveborn, born in hospital, delivered by vaginal delivery Date of Admission: 2018 LOS: 2 Gestational Age: Gestational Age: 37w11d Daily Goal:  
 
Birth Weight: 3.595 kg Current Weight: Weight: 3.32 kg 
% of Weight Change: -8% Feeding: 
 Metabolic Screen: YES Hepatitis B:  YES Discharge Bili:  YES Car Seat Trial, if needed:  N/A Patient/Family Teaching Needs:  
 
Days before discharge: Ready for discharge In Attendance:  Nursing and Physician

## 2018-09-24 NOTE — ROUTINE PROCESS
0730: Bedside shift change report given to NITZA Lira RN (oncoming nurse) by Sharan Munoz RN (offgoing nurse). Report included the following information SBAR. 1130: Discharge instructions reviewed with pt and all questions answered. Prescription given. Follow up in 6 weeks with Dr. Ortiz. Pt to call when ready for wheelchair. 1255: Pt discharged in wheelchair by volunteers.

## 2018-09-24 NOTE — LACTATION NOTE
This note was copied from a baby's chart. Baby nursing well and has improved throughout post partum stay, deep latch maintained, mother is comfortable, milk is in transition, baby feeding vigorously with rhythmic suck, swallow, breathe pattern, with audible swallowing, and evident milk transfer, both breasts offered, baby is asleep following feeding. Baby is feeding on demand, voiding and stools present as appropriate over the last 24 hours. Infant is at a 7% weight loss.

## 2018-09-24 NOTE — ROUTINE PROCESS
Bedside shift change report given to Liz Flor RN (oncoming nurse) by JANIS Delatorre RN (offgoing nurse). Report included the following information SBAR, Kardex, Procedure Summary, Intake/Output, MAR and Recent Results.

## 2018-09-24 NOTE — DISCHARGE SUMMARY
Obstetrical Discharge Summary     Name: Nu Mike MRN: [id number]  SSN: [id number]    YOB: 1985  Age: 29 y.o. Sex: female      Admit Date: 2018    Discharge Date: 2018    Admitting Physician: Samir Chery MD     Attending Physician:  Emily Vela MD     Discharge Diagnoses:   Information for the patient's :  Elif Kenny [[id number]]   Delivery of a 3.595 kg male infant via Vaginal, Spontaneous Delivery on 2018 at 5:05 PM  by . Apgars were 8 and 9. Additional Diagnoses:   Problem List as of 2018  Date Reviewed: 2016          Codes Class Noted - Resolved    RESOLVED: Normal labor ICD-10-CM: O80, Z37.9  ICD-9-CM: 245  2018 - 2018        RESOLVED: Normal delivery ICD-10-CM: O80, Z37.9  ICD-9-CM: 790  2016 - 2016        RESOLVED: Normal pregnancy in third trimester ICD-10-CM: Z34.93  ICD-9-CM: V22.2  2016 - 2016              Lab Results   Component Value Date/Time    Rubella, External Immune 02/15/2018    GrBStrep, External Positive 2018     Recent Labs      18   1255   HGB  11.1*       Hospital Course: Normal hospital course following the delivery. Patient Instructions:   Current Discharge Medication List      START taking these medications    Details   ibuprofen (MOTRIN) 800 mg tablet Take 1 Tab by mouth every eight (8) hours as needed for Pain. Qty: 60 Tab, Refills: 0         CONTINUE these medications which have NOT CHANGED    Details   valACYclovir (VALTREX) 500 mg tablet Take 500 mg by mouth two (2) times a day. Indications: SUPPRESSION OF RECURRENT HERPES SIMPLEX INFECTION      PNV no.24-iron-folic acid-dha -021 mg-mcg-mg cmpk Take  by mouth. Indications: PREGNANCY             Reference my discharge instructions.     Follow-up Appointments   Procedures    FOLLOW UP VISIT Appointment in: 6 Weeks     Standing Status:   Standing     Number of Occurrences:   1     Order Specific Question:   Appointment in     Answer:   6 Weeks        Signed By:  Cathlean Kawasaki, MD     September 24, 2018                       BST

## 2019-12-03 NOTE — PROGRESS NOTES
Post-Partum Day Number 2 Progress Note [facility] Assessment:  
Hospital Problems  Date Reviewed: 2016 Codes Class Noted POA Normal labor ICD-10-CM: O80, Z37.9 ICD-9-CM: 568  2018 Unknown Doing well, post partum day 2 No circ Plan: 1. Discharge home today 2. Follow up in office in 6 weeks with Leo oMulton MD  
3. Post partum activity advised, diet as tolerated 4. Discharge Medications: ibuprofen,  and medications prior to admission Information for the patient's :  Velpercy Breaker [[id number]] Vaginal, Spontaneous Delivery Patient doing well without significant complaint. Voiding without difficulty, normal lochia. Current Facility-Administered Medications Medication Dose Route Frequency  sodium chloride (NS) flush 5-10 mL  5-10 mL IntraVENous Q8H  
 diph,Pertuss(AC),Tet Vac-PF (BOOSTRIX) suspension 0.5 mL  0.5 mL IntraMUSCular PRIOR TO DISCHARGE  lactated Ringers infusion  125 mL/hr IntraVENous CONTINUOUS  
 fentaNYL 2mcg/mL - bupivacaine 0.125% pf epidural  1-16 mL/hr Epidural CONTINUOUS  ibuprofen (MOTRIN) tablet 800 mg  800 mg Oral Q8H Vitals: 
Visit Vitals  /66 (BP 1 Location: Right arm, BP Patient Position: At rest;Sitting)  Pulse 87  Temp 98.3 °F (36.8 °C)  Resp 16  
 Ht 5' 6\" (1.676 m)  Wt 60.3 kg (133 lb)  SpO2 100%  Breastfeeding No  
 BMI 21.47 kg/m2 Temp (24hrs), Av.7 °F (36.5 °C), Min:97.3 °F (36.3 °C), Max:98.3 °F (36.8 °C) Exam:    
    Patient without distress. Abdomen soft, fundus firm, nontender Lower extremities are negative for swelling, cords or tenderness. Labs:  
 
Lab Results Component Value Date/Time  WBC 15.3 (H) 2018 12:55 PM  
 WBC 13.6 (H) 2016 06:40 AM  
 HGB 11.1 (L) 2018 12:55 PM  
 HGB 12.9 2016 06:40 AM  
 HCT 33.1 (L) 2018 12:55 PM  
 HCT 38.0 2016 06:40 AM  
 PLATELET 356 62/80/5140 12:55 PM  
 PLATELET 458 98/19/9794 06:40 AM  
 
 
No results found for this or any previous visit (from the past 24 hour(s)). No

## 2020-06-26 ENCOUNTER — HOSPITAL ENCOUNTER (OUTPATIENT)
Dept: GENERAL RADIOLOGY | Age: 35
Discharge: HOME | End: 2020-06-26

## 2020-06-26 DIAGNOSIS — Z33.1 IUD FAILURE, PREGNANT: ICD-10-CM

## 2020-06-26 DIAGNOSIS — T83.31XA IUD FAILURE, PREGNANT: ICD-10-CM

## 2020-07-01 ENCOUNTER — HOSPITAL ENCOUNTER (OUTPATIENT)
Dept: ULTRASOUND IMAGING | Age: 35
Discharge: HOME | End: 2020-07-01
Attending: OBSTETRICS & GYNECOLOGY
Payer: COMMERCIAL

## 2020-07-01 DIAGNOSIS — O26.30: ICD-10-CM

## 2020-07-01 PROCEDURE — 76700 US EXAM ABDOM COMPLETE: CPT

## 2020-07-01 PROCEDURE — 76801 OB US < 14 WKS SINGLE FETUS: CPT

## 2020-07-01 PROCEDURE — 76817 TRANSVAGINAL US OBSTETRIC: CPT

## 2020-07-27 ENCOUNTER — HOSPITAL ENCOUNTER (OUTPATIENT)
Dept: PREADMISSION TESTING | Age: 35
Discharge: HOME | End: 2020-07-27
Payer: COMMERCIAL

## 2020-07-27 DIAGNOSIS — U07.1 COVID-19: ICD-10-CM

## 2020-07-27 PROCEDURE — 87635 SARS-COV-2 COVID-19 AMP PRB: CPT

## 2020-07-28 LAB — SARS-COV-2, COV2NT: NOT DETECTED

## 2020-07-29 ENCOUNTER — ANESTHESIA EVENT (OUTPATIENT)
Dept: SURGERY | Age: 35
End: 2020-07-29
Payer: COMMERCIAL

## 2020-07-29 NOTE — PERIOP NOTES
PAT TELEPHONE INTERVIEW COMPLETED. PRE-OP INSTRUCTIONS REVIEWED WITH PATIENT WHICH INCLUDED INSTRUCTIONS ON MEDICATIONS AND NPO AFTER MIDNIGHT. INSTRUCTIONS GIVEN ON THE USE OF CHLORHEXIDINE SOLUTION THE EVENING BEFORE AND THE MORNING OF SURGERY PATIENT VOICED UNDERSTANDING OF SAME. CONFIRMED PATIENT'S UNDERSTANDING OF VISITOR POLICY AND ARRIVAL PROCESS FOR THE DAY OF SURGERY.

## 2020-07-31 ENCOUNTER — ANESTHESIA (OUTPATIENT)
Dept: SURGERY | Age: 35
End: 2020-07-31
Payer: COMMERCIAL

## 2020-07-31 ENCOUNTER — HOSPITAL ENCOUNTER (OUTPATIENT)
Age: 35
Setting detail: OUTPATIENT SURGERY
Discharge: HOME | End: 2020-07-31
Attending: OBSTETRICS & GYNECOLOGY | Admitting: OBSTETRICS & GYNECOLOGY
Payer: COMMERCIAL

## 2020-07-31 ENCOUNTER — APPOINTMENT (OUTPATIENT)
Dept: GENERAL RADIOLOGY | Age: 35
End: 2020-07-31
Attending: OBSTETRICS & GYNECOLOGY
Payer: COMMERCIAL

## 2020-07-31 VITALS
BODY MASS INDEX: 19.29 KG/M2 | SYSTOLIC BLOOD PRESSURE: 96 MMHG | TEMPERATURE: 97.4 F | OXYGEN SATURATION: 98 % | WEIGHT: 120 LBS | RESPIRATION RATE: 17 BRPM | DIASTOLIC BLOOD PRESSURE: 54 MMHG | HEIGHT: 66 IN | HEART RATE: 78 BPM

## 2020-07-31 DIAGNOSIS — O03.9 MISCARRIAGE: Primary | ICD-10-CM

## 2020-07-31 LAB
ABO + RH BLD: NORMAL
BASOPHILS # BLD: 0.1 K/UL (ref 0–0.1)
BASOPHILS NFR BLD: 1 % (ref 0–1)
BLOOD GROUP ANTIBODIES SERPL: NORMAL
DIFFERENTIAL METHOD BLD: NORMAL
EOSINOPHIL # BLD: 0.2 K/UL (ref 0–0.4)
EOSINOPHIL NFR BLD: 2 % (ref 0–7)
ERYTHROCYTE [DISTWIDTH] IN BLOOD BY AUTOMATED COUNT: 12.6 % (ref 11.5–14.5)
HCT VFR BLD AUTO: 40.3 % (ref 35–47)
HGB BLD-MCNC: 13.8 G/DL (ref 11.5–16)
IMM GRANULOCYTES # BLD AUTO: 0 K/UL (ref 0–0.04)
IMM GRANULOCYTES NFR BLD AUTO: 0 % (ref 0–0.5)
LYMPHOCYTES # BLD: 1.6 K/UL (ref 0.8–3.5)
LYMPHOCYTES NFR BLD: 22 % (ref 12–49)
MCH RBC QN AUTO: 31.5 PG (ref 26–34)
MCHC RBC AUTO-ENTMCNC: 34.2 G/DL (ref 30–36.5)
MCV RBC AUTO: 92 FL (ref 80–99)
MONOCYTES # BLD: 0.4 K/UL (ref 0–1)
MONOCYTES NFR BLD: 6 % (ref 5–13)
NEUTS SEG # BLD: 5.3 K/UL (ref 1.8–8)
NEUTS SEG NFR BLD: 69 % (ref 32–75)
NRBC # BLD: 0 K/UL (ref 0–0.01)
NRBC BLD-RTO: 0 PER 100 WBC
PLATELET # BLD AUTO: 307 K/UL (ref 150–400)
PMV BLD AUTO: 9.3 FL (ref 8.9–12.9)
RBC # BLD AUTO: 4.38 M/UL (ref 3.8–5.2)
SPECIMEN EXP DATE BLD: NORMAL
WBC # BLD AUTO: 7.5 K/UL (ref 3.6–11)

## 2020-07-31 PROCEDURE — 74018 RADEX ABDOMEN 1 VIEW: CPT

## 2020-07-31 PROCEDURE — 74011000250 HC RX REV CODE- 250: Performed by: OBSTETRICS & GYNECOLOGY

## 2020-07-31 PROCEDURE — 76060000032 HC ANESTHESIA 0.5 TO 1 HR: Performed by: OBSTETRICS & GYNECOLOGY

## 2020-07-31 PROCEDURE — 76210000020 HC REC RM PH II FIRST 0.5 HR: Performed by: OBSTETRICS & GYNECOLOGY

## 2020-07-31 PROCEDURE — 74011250636 HC RX REV CODE- 250/636: Performed by: NURSE ANESTHETIST, CERTIFIED REGISTERED

## 2020-07-31 PROCEDURE — 76010000138 HC OR TIME 0.5 TO 1 HR: Performed by: OBSTETRICS & GYNECOLOGY

## 2020-07-31 PROCEDURE — 74011250636 HC RX REV CODE- 250/636: Performed by: ANESTHESIOLOGY

## 2020-07-31 PROCEDURE — 74011250637 HC RX REV CODE- 250/637: Performed by: OBSTETRICS & GYNECOLOGY

## 2020-07-31 PROCEDURE — 76210000017 HC OR PH I REC 1.5 TO 2 HR: Performed by: OBSTETRICS & GYNECOLOGY

## 2020-07-31 PROCEDURE — 74011250637 HC RX REV CODE- 250/637: Performed by: ANESTHESIOLOGY

## 2020-07-31 PROCEDURE — 74011000250 HC RX REV CODE- 250: Performed by: NURSE ANESTHETIST, CERTIFIED REGISTERED

## 2020-07-31 PROCEDURE — 77030008578 HC TBNG UTER SUC BUSS -A: Performed by: OBSTETRICS & GYNECOLOGY

## 2020-07-31 PROCEDURE — 36415 COLL VENOUS BLD VENIPUNCTURE: CPT

## 2020-07-31 PROCEDURE — 74011000250 HC RX REV CODE- 250: Performed by: ANESTHESIOLOGY

## 2020-07-31 PROCEDURE — 85025 COMPLETE CBC W/AUTO DIFF WBC: CPT

## 2020-07-31 PROCEDURE — 77030011210: Performed by: OBSTETRICS & GYNECOLOGY

## 2020-07-31 PROCEDURE — 86900 BLOOD TYPING SEROLOGIC ABO: CPT

## 2020-07-31 PROCEDURE — 88305 TISSUE EXAM BY PATHOLOGIST: CPT

## 2020-07-31 PROCEDURE — 77030040922 HC BLNKT HYPOTHRM STRY -A

## 2020-07-31 PROCEDURE — 77030040361 HC SLV COMPR DVT MDII -B: Performed by: OBSTETRICS & GYNECOLOGY

## 2020-07-31 PROCEDURE — 77030003666 HC NDL SPINAL BD -A: Performed by: OBSTETRICS & GYNECOLOGY

## 2020-07-31 RX ORDER — SODIUM CHLORIDE 0.9 % (FLUSH) 0.9 %
5-40 SYRINGE (ML) INJECTION AS NEEDED
Status: DISCONTINUED | OUTPATIENT
Start: 2020-07-31 | End: 2020-07-31 | Stop reason: HOSPADM

## 2020-07-31 RX ORDER — FENTANYL CITRATE 50 UG/ML
50 INJECTION, SOLUTION INTRAMUSCULAR; INTRAVENOUS AS NEEDED
Status: DISCONTINUED | OUTPATIENT
Start: 2020-07-31 | End: 2020-07-31 | Stop reason: HOSPADM

## 2020-07-31 RX ORDER — MIDAZOLAM HYDROCHLORIDE 1 MG/ML
INJECTION, SOLUTION INTRAMUSCULAR; INTRAVENOUS AS NEEDED
Status: DISCONTINUED | OUTPATIENT
Start: 2020-07-31 | End: 2020-07-31 | Stop reason: HOSPADM

## 2020-07-31 RX ORDER — ACETAMINOPHEN 325 MG/1
650 TABLET ORAL ONCE
Status: COMPLETED | OUTPATIENT
Start: 2020-07-31 | End: 2020-07-31

## 2020-07-31 RX ORDER — OXYCODONE AND ACETAMINOPHEN 5; 325 MG/1; MG/1
1-2 TABLET ORAL
Qty: 10 TAB | Refills: 0 | Status: SHIPPED | OUTPATIENT
Start: 2020-07-31 | End: 2020-08-03

## 2020-07-31 RX ORDER — SODIUM CHLORIDE 0.9 % (FLUSH) 0.9 %
5-40 SYRINGE (ML) INJECTION EVERY 8 HOURS
Status: DISCONTINUED | OUTPATIENT
Start: 2020-07-31 | End: 2020-07-31 | Stop reason: HOSPADM

## 2020-07-31 RX ORDER — MORPHINE SULFATE 10 MG/ML
2 INJECTION, SOLUTION INTRAMUSCULAR; INTRAVENOUS
Status: DISCONTINUED | OUTPATIENT
Start: 2020-07-31 | End: 2020-07-31 | Stop reason: HOSPADM

## 2020-07-31 RX ORDER — PROPOFOL 10 MG/ML
INJECTION, EMULSION INTRAVENOUS AS NEEDED
Status: DISCONTINUED | OUTPATIENT
Start: 2020-07-31 | End: 2020-07-31 | Stop reason: HOSPADM

## 2020-07-31 RX ORDER — ONDANSETRON 2 MG/ML
4 INJECTION INTRAMUSCULAR; INTRAVENOUS AS NEEDED
Status: DISCONTINUED | OUTPATIENT
Start: 2020-07-31 | End: 2020-07-31 | Stop reason: HOSPADM

## 2020-07-31 RX ORDER — DOXYCYCLINE HYCLATE 100 MG
200 TABLET ORAL
Status: COMPLETED | OUTPATIENT
Start: 2020-07-31 | End: 2020-07-31

## 2020-07-31 RX ORDER — LIDOCAINE HYDROCHLORIDE 10 MG/ML
0.5 INJECTION, SOLUTION EPIDURAL; INFILTRATION; INTRACAUDAL; PERINEURAL AS NEEDED
Status: DISCONTINUED | OUTPATIENT
Start: 2020-07-31 | End: 2020-07-31 | Stop reason: HOSPADM

## 2020-07-31 RX ORDER — FENTANYL CITRATE 50 UG/ML
25 INJECTION, SOLUTION INTRAMUSCULAR; INTRAVENOUS
Status: DISCONTINUED | OUTPATIENT
Start: 2020-07-31 | End: 2020-07-31 | Stop reason: HOSPADM

## 2020-07-31 RX ORDER — FENTANYL CITRATE 50 UG/ML
INJECTION, SOLUTION INTRAMUSCULAR; INTRAVENOUS AS NEEDED
Status: DISCONTINUED | OUTPATIENT
Start: 2020-07-31 | End: 2020-07-31 | Stop reason: HOSPADM

## 2020-07-31 RX ORDER — DEXTROSE, SODIUM CHLORIDE, SODIUM LACTATE, POTASSIUM CHLORIDE, AND CALCIUM CHLORIDE 5; .6; .31; .03; .02 G/100ML; G/100ML; G/100ML; G/100ML; G/100ML
125 INJECTION, SOLUTION INTRAVENOUS CONTINUOUS
Status: DISCONTINUED | OUTPATIENT
Start: 2020-07-31 | End: 2020-07-31 | Stop reason: HOSPADM

## 2020-07-31 RX ORDER — EPHEDRINE SULFATE/0.9% NACL/PF 50 MG/5 ML
10 SYRINGE (ML) INTRAVENOUS ONCE
Status: COMPLETED | OUTPATIENT
Start: 2020-07-31 | End: 2020-07-31

## 2020-07-31 RX ORDER — MIDAZOLAM HYDROCHLORIDE 1 MG/ML
1 INJECTION, SOLUTION INTRAMUSCULAR; INTRAVENOUS
Status: DISCONTINUED | OUTPATIENT
Start: 2020-07-31 | End: 2020-07-31 | Stop reason: HOSPADM

## 2020-07-31 RX ORDER — MIDAZOLAM HYDROCHLORIDE 1 MG/ML
1 INJECTION, SOLUTION INTRAMUSCULAR; INTRAVENOUS AS NEEDED
Status: DISCONTINUED | OUTPATIENT
Start: 2020-07-31 | End: 2020-07-31 | Stop reason: HOSPADM

## 2020-07-31 RX ORDER — LIDOCAINE HYDROCHLORIDE 20 MG/ML
INJECTION, SOLUTION EPIDURAL; INFILTRATION; INTRACAUDAL; PERINEURAL AS NEEDED
Status: DISCONTINUED | OUTPATIENT
Start: 2020-07-31 | End: 2020-07-31 | Stop reason: HOSPADM

## 2020-07-31 RX ORDER — ONDANSETRON 2 MG/ML
INJECTION INTRAMUSCULAR; INTRAVENOUS AS NEEDED
Status: DISCONTINUED | OUTPATIENT
Start: 2020-07-31 | End: 2020-07-31 | Stop reason: HOSPADM

## 2020-07-31 RX ORDER — DEXAMETHASONE SODIUM PHOSPHATE 4 MG/ML
INJECTION, SOLUTION INTRA-ARTICULAR; INTRALESIONAL; INTRAMUSCULAR; INTRAVENOUS; SOFT TISSUE AS NEEDED
Status: DISCONTINUED | OUTPATIENT
Start: 2020-07-31 | End: 2020-07-31 | Stop reason: HOSPADM

## 2020-07-31 RX ORDER — SODIUM CHLORIDE, SODIUM LACTATE, POTASSIUM CHLORIDE, CALCIUM CHLORIDE 600; 310; 30; 20 MG/100ML; MG/100ML; MG/100ML; MG/100ML
125 INJECTION, SOLUTION INTRAVENOUS CONTINUOUS
Status: DISCONTINUED | OUTPATIENT
Start: 2020-07-31 | End: 2020-07-31 | Stop reason: HOSPADM

## 2020-07-31 RX ORDER — DIPHENHYDRAMINE HYDROCHLORIDE 50 MG/ML
12.5 INJECTION, SOLUTION INTRAMUSCULAR; INTRAVENOUS AS NEEDED
Status: DISCONTINUED | OUTPATIENT
Start: 2020-07-31 | End: 2020-07-31 | Stop reason: HOSPADM

## 2020-07-31 RX ORDER — DEXMEDETOMIDINE HYDROCHLORIDE 100 UG/ML
INJECTION, SOLUTION INTRAVENOUS AS NEEDED
Status: DISCONTINUED | OUTPATIENT
Start: 2020-07-31 | End: 2020-07-31 | Stop reason: HOSPADM

## 2020-07-31 RX ORDER — LIDOCAINE HYDROCHLORIDE AND EPINEPHRINE 10; 10 MG/ML; UG/ML
INJECTION, SOLUTION INFILTRATION; PERINEURAL AS NEEDED
Status: DISCONTINUED | OUTPATIENT
Start: 2020-07-31 | End: 2020-07-31 | Stop reason: HOSPADM

## 2020-07-31 RX ORDER — KETOROLAC TROMETHAMINE 30 MG/ML
INJECTION, SOLUTION INTRAMUSCULAR; INTRAVENOUS AS NEEDED
Status: DISCONTINUED | OUTPATIENT
Start: 2020-07-31 | End: 2020-07-31 | Stop reason: HOSPADM

## 2020-07-31 RX ORDER — OXYCODONE HYDROCHLORIDE 5 MG/1
5 TABLET ORAL AS NEEDED
Status: DISCONTINUED | OUTPATIENT
Start: 2020-07-31 | End: 2020-07-31 | Stop reason: HOSPADM

## 2020-07-31 RX ORDER — EPHEDRINE SULFATE/0.9% NACL/PF 50 MG/5 ML
SYRINGE (ML) INTRAVENOUS
Status: DISCONTINUED
Start: 2020-07-31 | End: 2020-07-31 | Stop reason: HOSPADM

## 2020-07-31 RX ADMIN — DEXAMETHASONE SODIUM PHOSPHATE 8 MG: 4 INJECTION, SOLUTION INTRAMUSCULAR; INTRAVENOUS at 11:25

## 2020-07-31 RX ADMIN — ONDANSETRON HYDROCHLORIDE 4 MG: 2 INJECTION, SOLUTION INTRAMUSCULAR; INTRAVENOUS at 11:25

## 2020-07-31 RX ADMIN — PROPOFOL 50 MG: 10 INJECTION, EMULSION INTRAVENOUS at 11:33

## 2020-07-31 RX ADMIN — DOXYCYCLINE HYCLATE 200 MG: 100 TABLET, COATED ORAL at 09:59

## 2020-07-31 RX ADMIN — DEXMEDETOMIDINE HYDROCHLORIDE 4 MCG: 100 INJECTION, SOLUTION, CONCENTRATE INTRAVENOUS at 11:25

## 2020-07-31 RX ADMIN — PROPOFOL 50 MG: 10 INJECTION, EMULSION INTRAVENOUS at 11:21

## 2020-07-31 RX ADMIN — PROPOFOL 50 MG: 10 INJECTION, EMULSION INTRAVENOUS at 11:36

## 2020-07-31 RX ADMIN — PROPOFOL 50 MG: 10 INJECTION, EMULSION INTRAVENOUS at 11:25

## 2020-07-31 RX ADMIN — ACETAMINOPHEN 650 MG: 325 TABLET ORAL at 09:59

## 2020-07-31 RX ADMIN — SODIUM CHLORIDE, SODIUM LACTATE, POTASSIUM CHLORIDE, AND CALCIUM CHLORIDE 125 ML/HR: 600; 310; 30; 20 INJECTION, SOLUTION INTRAVENOUS at 10:01

## 2020-07-31 RX ADMIN — PROPOFOL 50 MG: 10 INJECTION, EMULSION INTRAVENOUS at 11:39

## 2020-07-31 RX ADMIN — MEPERIDINE HYDROCHLORIDE 25 MG: 50 INJECTION INTRAMUSCULAR; INTRAVENOUS; SUBCUTANEOUS at 11:17

## 2020-07-31 RX ADMIN — SODIUM CHLORIDE, SODIUM LACTATE, POTASSIUM CHLORIDE, AND CALCIUM CHLORIDE 500 ML: 600; 310; 30; 20 INJECTION, SOLUTION INTRAVENOUS at 12:15

## 2020-07-31 RX ADMIN — FENTANYL CITRATE 25 MCG: 50 INJECTION, SOLUTION INTRAMUSCULAR; INTRAVENOUS at 11:26

## 2020-07-31 RX ADMIN — MIDAZOLAM 4 MG: 1 INJECTION INTRAMUSCULAR; INTRAVENOUS at 11:11

## 2020-07-31 RX ADMIN — PROPOFOL 50 MG: 10 INJECTION, EMULSION INTRAVENOUS at 11:20

## 2020-07-31 RX ADMIN — FENTANYL CITRATE 25 MCG: 50 INJECTION, SOLUTION INTRAMUSCULAR; INTRAVENOUS at 11:42

## 2020-07-31 RX ADMIN — PROPOFOL 25 MG: 10 INJECTION, EMULSION INTRAVENOUS at 11:27

## 2020-07-31 RX ADMIN — FENTANYL CITRATE 25 MCG: 50 INJECTION, SOLUTION INTRAMUSCULAR; INTRAVENOUS at 11:22

## 2020-07-31 RX ADMIN — Medication 10 MG: at 12:15

## 2020-07-31 RX ADMIN — LIDOCAINE HYDROCHLORIDE 60 MG: 20 INJECTION, SOLUTION EPIDURAL; INFILTRATION; INTRACAUDAL; PERINEURAL at 11:20

## 2020-07-31 RX ADMIN — FENTANYL CITRATE 25 MCG: 50 INJECTION, SOLUTION INTRAMUSCULAR; INTRAVENOUS at 11:24

## 2020-07-31 RX ADMIN — KETOROLAC TROMETHAMINE 30 MG: 30 INJECTION, SOLUTION INTRAMUSCULAR; INTRAVENOUS at 11:46

## 2020-07-31 RX ADMIN — PROPOFOL 50 MG: 10 INJECTION, EMULSION INTRAVENOUS at 11:23

## 2020-07-31 NOTE — H&P
Print      Patient  Name UBALDO Higgins ID# [id number] Appt. Date/Time 2020 02:30PM    1985 Service Dept. [facility] [facility]   Provider Mari Gonzalez MD   Insurance Med [address]: [address] # : [id number]  Policy/Group # : [id number]  Prescription: [facility] - Member is eligible. details   Chief Complaint  GYN problem  Patient's Care Team  OB/GYN: Mari Gonzalez MD (VIRTUAL VISITS AVAILABLE): [facility] [address] [facility], Ph [phone], Fax [phone] NPI: [id number]  Notes: NO PCP  Patient's Pharmacies  [facility] #[id number]): [address] [place] [address], [address] [place] [address], Ph [phone], Fax   2020 02:35 pm  Ht: 5 ft 6 in (167.64 cm) Wt: 119.6 lbs (54.25 kg) BMI: 19.3   Allergies  Allergies not reviewed (last reviewed 2020)     NKDA   Medications  No medications reported  Vaccines  Vaccines not reviewed (last reviewed 2019)    Vaccine Type Date Amt. Route Site [address] Lot # Mfr. Exp.   Date Date  on VIS VIS  Given Vaccinator   Diphtheria, Tetanus, Pertussis   Tdap 18 0.5 mL    [id number] [employer] 10/02/20 02/24/15  Sheree Tom LPN - Team 3 [address] [facility]                                                     Tdap 10/27/15 0.5 mL    [id number] [employer] 10/08/17 02/24/15  Brian LPN - Team 1 [facility]                                                     Hepatitis B   Hep B 05/20/15                                                               Influenza   influenza, intradermal, quadrivalent, preservative free 02/15/18     [id number]  04/01/18 08/07/15  Sheree Tom LPN - Team 3 [address] [facility]                                                     influenza, injectable, quadrivalent, preservative free 10/14/16 0.5 mL    [id number] [employer]line 06/04/17 08/07/15  Brian LYONN - Team 1 [facility]                                                     influenza, injectable, quadrivalent, preservative free 10/27/15 0.5 mL    [id number] [employer] 16 08/07/15  Mason LPN - Team 1 [facility]                                                     Problems  Reviewed Problems     Insertion of intrauterine contraceptive device - Onset: 2018 - Mirena 18   Postpartum state - Onset: 2018   Group B Streptococcus carrier - Onset: 2018   Herpes simplex - Onset: 2018  Family History  Discussed Family History    Father - No current problems or disability   Mother - No current problems or disability   Social History  Discussed Social History  OB/GYN Social History  Chewing tobacco: none  Tobacco Smoking Status: Never smoker  Smokeless Tobacco Status: Never used smokeless tobacco  E-cigarette/Vape Status: Never used electronic cigarettes  Most Recent Tobacco Use Screenin2019  Marital status:   Sexual orientation?: Straight or heterosexual  Number of children: 2 (Notes: 1 boy, 1 girl)  Are you working: Yes  Occupation: work from home  How often do you have a [employer] containing ALCOHOL?: Monthly or less (Notes: wine)  Surgical History  Reviewed Surgical History     Extraction of wisdom tooth  GYN History  Reviewed GYN History  Date of LMP: 2020 (Notes: regular w/ IUD starting beginning of . Breastfeeding). Frequency of Cycle (Q days): 28. Duration of Flow (days): 3. Flow: Light. Menses Monthly: Y. Date of Last Pap Smear: 2019 (Notes: ASCUS, HPV-). Date of HPV testin2019 (Notes: Negative). HPV testing results: Negative. Abnormal Pap: Y (Notes: Abnormal pap, HPV Negative '14). HPV Vaccine: N. Sexually Active?: Y. Sexual Problems?: N.  STIs/STDs: Y (Notes: Herpes). Current Birth Control Method: Pregnant (Notes: Mirena inserted 2018, IUD misplaced 2020-pt pregnant). Endometriosis: N. Fibroids: N. Infertility: N. Ovarian Cyst: Y (Notes: 4.0 cm simple cyst left ovary.-noted on 2020 ultrasound). PCOS: N.  Obstetric History  Reviewed Obstetric History    TOTAL FULL PRE AB. I AB.  S ECTOPICS MULTIPLE LIVING   3 2   1   2   Past Pregnancies  Date # Fetuses GA Wks Labor Length Birth Weight Sex Delivery Type Outcome Anesthesia Delivery Place  Labor Notes Source   2016 1 41.3 6 hrs. F Vaginal delivery Full Term Birth [facility] Ray[facility]- Apgars 9/9, 1st degree perineal laceration. Post dates GBS+ historical   2018 1 39.6  7 lbs. 14 oz. M Vaginal delivery Full Term Birth  [id number])  Dede Dillon .  over intact perineum. episode   2020 1 11      Fetal Demise    hydrops, plueral effusion, fetal demise at 11 wks historical   Genetic Screening and Infection History  Medications (including Supplements, Vitamins, Herbs, OTC Drugs), Illicit/Recreational Drugs, Alcohol: Y, prenatal.  Patient Or Partner Has History Of Genital Herpes:  Y  Patient's Age Will Be 28 Years Or Older At Estimated Date of Delivery: N  2824: Thalassemia ([place], [place], Mediterranean, Or  Background): MCV < 80: N  Neural Tube Defect (Meningomyelocele, Spina Bifida, Or Anencephaly): N  746: Congenital Heart Defect: N  7580: Down Syndrome: N  Claude-Sachs (eg, [facility] [place]): N  Canavan Disease: N  282: Sickle Cell Disease Or Trait (): N  Hemophilia Or Other Blood Disorders: N  359: Muscular Dystrophy: N  2770: Cystic Fibrosis: N  3334: Lesvia's Chorea: N  319: Mental Retardation/Autism: N  If Yes, Was Person Tested For Fragile X?: N  Other Inherited Genetic Or Chromosomal Disorder: N  Maternal Metabolic Disorder (eg, Type 1 Diabetes, PKU): N  Patient Or [de-identified] Father Had A Child With Birth Defects Not Listed Above: N  Recurrent Pregnancy Loss, Or A Stillbirth: N  If Yes, Agent(s) And Strength/Dosage: N  Any Other Genetic History: N  Live With Someone With TB Or Exposed To TB: N  Rash Or Viral Illness Since Last Menstrual Period: N  History Of STD, Gonorrhea, Chlamydia, HPV, Syphilis: N  Other Infection History: N  History of Hepatitis: N  History of HIV: N  Prior GBS-infected child: N  Prenatal Flowsheet  Fundus Pres FHR FM PLS Cervix Exam BP Wt Edema Glucose Protein Leukocytes Nitrite Ketones Blood   2020   9 wks                                          Comments: chart prep (not with pt),Initial ultrasound dating on 2020, measuring 5w3d. ultrasound performed on 2020, no IUD found; Single viable intrauterine pregnancy with estimated gestational age of 7 weeks 6 days. radiologist recommended xray 15-20 weeks if IUD not found on ultrasound. 07/10/2020   9 wks   [email or web address]                         170    102/66 120 lbs none none neg       Comments: Patient presents for her new OB visit. U/S today demonstrates normal IUP. Discussed prenatal labs, carrier testing options, aneuploidy testing options, and reviewed prenatal packet/folder/deck system/nutrition. Environmental/work hazards reviewed. IUD failure, Mirena placed 2018, [facility] at [facility] 2020: Single viable intrauterine pregnancy with estimated gestational age of 9 weeks 5 days. FHR of 157. 4.0 cm simple cyst left ovary. Nonvisualization of the right ovary. No intrauterine device is visualized. Correlation with transvaginal ultrasound will be performed. Xray to be done at 15-20 weeks H/o HSV Last pap 2019 ASCUS, hpv - C/o nausea Pt denies Headaches, vomiting, Abnormal discharge, bleeding and cramping.   2020     [email or web address]                              119.6 lbs          Past Medical History  Discussed Past Medical History  Allergies (environmental/food): Y - seasonal  HPI  29 y/o  patient presents for f/u US for fetal hydrops and plural effusion.     Last US 07/10/2020 showed hydrops and plueral effusion at 9 wks    US today noted absent cardiac activity in fetus    Patient denies bleeding, pelvic pain and any change in sx  ROS  Additionally reports: Except as noted in the HPI, the review of systems is negative for General, Breast, , Resp, GI, CV, Endo, MS, Psych and Heme. ROS as noted in the HPI  Physical Exam  Patient is a 24-year-old female. Constitutional: General Appearance: well developed and nourished and pleasant. Level of Distress: no acute distress. Ambulation: ambulating normally. Head: Head: normocephalic. Eyes: Extraocular Movements extraocular movements intact. Ears, Nose, Mouth, Throat: Ears normal hearing. Nose: no external nose lesions. Neck: Appearance trachea midline. Lungs / Chest: Respiratory effort: unlabored. Cardiovascular: Rate And Rhythm: regular. Neurologic: Gait and Station: normal gait. Sensation: grossly intact. Motor: grossly intact. Mental Status Exam: Orientation oriented to person, place, and time. Assessment / Plan  1. Diffuse fetal skin edema -  US 7/10 - shows pleural effusion and skin edema, likely developing hydrops  - patient notified of results and concern for chromosomal abnormalities, infectious (parvo)  - reviewed likelihood that this pregnancy will end in SAB     - missed Ab 9week size  P83.39: Other edema specific to     2. Missed miscarriage -  Reviewed diagnosis of SAB, possible causes, and what to expect. Treatment options are reviewed - expectant, medical, and surgical - and the associated risks/benefits of each. Questions answered. - 9w size  - she would like to schedule D&C for one week, but see if passes spontaneously in that time  - ER precautions reviewed  - blood type Rh positive  - declines chromosomal testing  - consent for D&C reviewed verbally - sign consent at hospital    Risks of surgery are reviewed in detail, including bleeding/transfusion, infection, injury to internal organs with the need for further surgery to repair, blood clot, scarring of the uterus. O02.1: Missed     3. IUD failure - pregnant -  will need x-ray to confirm IUD expelled after SAB complete    T83. 89XA: Other specified complication of genitourinary prosthetic devices, implants and grafts, initial encounter      Return to Office  None recorded. Encounter Sign-Off  Encounter signed-off by Wei Hamlin MD, 07/24/2020.   Encounter performed and documented by Wei Hamlin MD  Encounter reviewed & signed by Wei Hamlin MD on 07/24/2020 at 3:13pm    There is not enough information to calculate an E&M code      Audit history

## 2020-07-31 NOTE — DISCHARGE SUMMARY
Gynecology Discharge Summary     Patient ID:  Indira Leo  [id number]  89 y.o.  1985    Admit date: 7/31/2020    Discharge date: 7/31/2020     Admission Diagnoses:   Patient Active Problem List   Diagnosis Code   (none) - all problems resolved or deleted       Discharge Diagnoses: There are no discharge diagnoses documented for the most recent discharge. Patient Active Problem List   Diagnosis Code   (none) - all problems resolved or deleted       Procedures for this admission: Procedure(s):  SUCTION DILATATION AND CURETTAGE (CHOICE)    Hospital Course:    Disposition: Home or self care    Discharged Condition: stable            Patient Instructions:   Current Discharge Medication List      START taking these medications    Details   oxyCODONE-acetaminophen (PERCOCET) 5-325 mg per tablet Take 1-2 Tabs by mouth every four (4) hours as needed for Pain for up to 3 days. Max Daily Amount: 12 Tabs.   Qty: 10 Tab, Refills: 0    Associated Diagnoses: Miscarriage           Activity: See surgical instructions  Diet: Regular Diet  Wound Care: Keep wound clean and dry and None needed    Signed:  Jean Claude Hope MD  7/31/2020  11:54 AM

## 2020-07-31 NOTE — PERIOP NOTES
Spoke with Dr. Celestina Spencer regarding BP, fluid bolus of 500cc of LR ordered and started.   Ephedrine 10mg IV ordered

## 2020-07-31 NOTE — ANESTHESIA POSTPROCEDURE EVALUATION
Post-Anesthesia Evaluation and Assessment    Patient: Leonidas Qureshi MRN: [id number]  SSN: [id number]    YOB: 1985  Age: 30 y.o. Sex: female      I have evaluated the patient and they are stable and ready for discharge from the PACU. Cardiovascular Function/Vital Signs  Visit Vitals  BP 96/54 (BP 1 Location: Left arm, BP Patient Position: At rest;Lying left side)   Pulse 78   Temp 36.3 °C (97.4 °F)   Resp 17   Ht 5' 6\" (1.676 m)   Wt 54.4 kg (120 lb)   SpO2 98%   BMI 19.37 kg/m²       Patient is status post Other anesthesia for Procedure(s):  SUCTION DILATATION AND CURETTAGE (CHOICE). Nausea/Vomiting: None    Postoperative hydration reviewed and adequate. Pain:  Pain Scale 1: Numeric (0 - 10) (07/31/20 1245)  Pain Intensity 1: 0 (07/31/20 1245)   Managed    Neurological Status:   Neuro (WDL): Within Defined Limits (07/31/20 1245)  Neuro  LUE Motor Response: Purposeful (07/31/20 1245)  LLE Motor Response: Purposeful (07/31/20 1245)  RUE Motor Response: Purposeful (07/31/20 1245)  RLE Motor Response: Purposeful (07/31/20 1245)   At baseline    Mental Status, Level of Consciousness: Alert and  oriented to person, place, and time    Pulmonary Status:   O2 Device: Room air (07/31/20 1245)   Adequate oxygenation and airway patent    Complications related to anesthesia: None    Post-anesthesia assessment completed. No concerns    Signed By: Ryan Smiley MD     July 31, 2020              Procedure(s):  SUCTION DILATATION AND CURETTAGE (CHOICE). MAC    <BSHSIANPOST>    INITIAL Post-op Vital signs:   Vitals Value Taken Time   BP 99/59 7/31/2020  1:00 PM   Temp 36.3 °C (97.4 °F) 7/31/2020 12:04 PM   Pulse 65 7/31/2020  1:01 PM   Resp 16 7/31/2020  1:01 PM   SpO2 100 % 7/31/2020  1:01 PM   Vitals shown include unvalidated device data.

## 2020-07-31 NOTE — DISCHARGE INSTRUCTIONS
Patient Education        Dilation and Curettage: What to Expect at Home  Your Recovery  Dilation and curettage (D&C) is a procedure to remove tissue from the inside of the uterus. The doctor used a curved tool, called a curette, to gently scrape tissue from your uterus. You are likely to have a backache, or cramps similar to menstrual cramps, and pass small clots of blood from your vagina for the first few days. You may continue to have light vaginal bleeding for several weeks after the procedure. You will probably be able to go back to most of your normal activities in 1 or 2 days. This care sheet gives you a general idea about how long it will take for you to recover. But each person recovers at a different pace. Follow the steps below to get better as quickly as possible. How can you care for yourself at home? Activity  · Rest when you feel tired. Getting enough sleep will help you recover. · Avoid strenuous activities, such as bicycle riding, jogging, weight lifting, or aerobic exercise, until your doctor says it is okay. · Most women are able to return to work the day after the procedure. · You may have some light vaginal bleeding. Wear sanitary pads if needed. Do not douche or use tampons for 2 weeks or until your doctor says it is okay. · Ask your doctor when it is okay for you to have sex. · If you could become pregnant, talk about birth control with your doctor. Do not try to become pregnant until your doctor says it is okay. Diet  · You can eat your normal diet. If your stomach is upset, try bland, low-fat foods like plain rice, broiled chicken, toast, and yogurt. · Drink plenty of fluids (unless your doctor tells you not to). Medicines  · Your doctor will tell you if and when you can restart your medicines. He or she will also give you instructions about taking any new medicines. · If you take aspirin or some other blood thinner, ask your doctor if and when to start taking it again.  Make sure that you understand exactly what your doctor wants you to do. · Be safe with medicines. Take pain medicines exactly as directed. ? If the doctor gave you a prescription medicine for pain, take it as prescribed. ? If you are not taking a prescription pain medicine, ask your doctor if you can take an over-the-counter medicine. · If you think your pain medicine is making you sick to your stomach:  ? Take your medicine after meals (unless your doctor has told you not to). ? Ask your doctor for a different pain medicine. · If your doctor prescribed antibiotics, take them as directed. Do not stop taking them just because you feel better. You need to take the full course of antibiotics. Follow-up care is a key part of your treatment and safety. Be sure to make and go to all appointments, and call your doctor if you are having problems. It's also a good idea to know your test results and keep a list of the medicines you take. When should you call for help? [id number] anytime you think you may need emergency care. For example, call if:  · You passed out (lost consciousness). · You have chest pain, are short of breath, or cough up blood. Call your doctor now or seek immediate medical care if:  · You have bright red vaginal bleeding that soaks one or more pads in an hour, or you have large clots. · You have vaginal discharge that increases in amount or smells bad. · You are sick to your stomach or cannot drink fluids. · You have pain that does not get better after you take pain medicine. · You cannot pass stools or gas. · You have symptoms of a blood clot in your leg (called a deep vein thrombosis), such as:  ? Pain in your calf, back of the knee, thigh, or groin. ? Redness and swelling in your leg. · You have signs of infection, such as:  ? Increased pain, swelling, warmth, or redness. ? Red streaks leading from the area. ? Pus draining from the area. ? A fever.   Watch closely for changes in your health, and be sure to contact your doctor if you have any problems. Where can you learn more? Go to http://warren-malou.info/  Enter D453 in the search box to learn more about \"Dilation and Curettage: What to Expect at Home. \"  Current as of: February 11, 2020               Content Version: 12.5  © 6844-5976 [employer]. Care instructions adapted under license by [employer] (which disclaims liability or warranty for this information). If you have questions about a medical condition or this instruction, always ask your healthcare professional. [address] any warranty or liability for your use of this information. ______________________________________________________________________    Anesthesia Discharge Instructions    After general anesthesia or intervenous sedation, for 24 hours or while taking prescription Narcotics:  · Limit your activities  · Do not drive or operate hazardous machinery  · If you have not urinated within 8 hours after discharge, please contact your surgeon on call. · Do not make important personal or business decisions  · Do not drink alcoholic beverages    Report the following to your surgeon:  · Excessive pain, swelling, redness or odor of or around the surgical area  · Temperature over 100.5 degrees  · Nausea and vomiting lasting longer than 4 hours or if unable to take medication  · Any signs of decreased circulation or nerve impairment to extremity:  Change in color, persistent numbness, tingling, coldness or increased pain. · Any questions    Ro received Tylenol at 10:00 am; no additional Tylenol or Tylenol containing products until 4:00 pm or after. Ro received Toradol at 11:45 am; no additional Motrin or Advil type products until 5:45 pm or after. Patient Education   Learning About Coronavirus [phone])  Coronavirus [phone]): Overview  What is coronavirus (LYSHM-81)?   The coronavirus disease (COVID-19) is caused by a virus. It is an illness that was first found in [place], [place], in December 2019. It has since spread worldwide. The virus can cause fever, cough, and trouble breathing. In severe cases, it can cause pneumonia and make it hard to breathe without help. It can cause death. Coronaviruses are a large group of viruses. They cause the common cold. They also cause more serious illnesses like Middle East respiratory syndrome (MERS) and severe acute respiratory syndrome (SARS). COVID-19 is caused by a novel coronavirus. That means it's a new type that has not been seen in people before. This virus spreads person-to-person through droplets from coughing and sneezing. It can also spread when you are close to someone who is infected. And it can spread when you touch something that has the virus on it, such as a doorknob or a tabletop. What can you do to protect yourself from coronavirus (COVID-19)? The best way to protect yourself from getting sick is to:  · Avoid areas where there is an outbreak. · Avoid contact with people who may be infected. · Wash your hands often with soap or alcohol-based hand sanitizers. · Avoid crowds and try to stay at least 6 feet away from other people. · Wash your hands often, especially after you cough or sneeze. Use soap and water, and scrub for at least 20 seconds. If soap and water aren't available, use an alcohol-based hand . · Avoid touching your mouth, nose, and eyes. What can you do to avoid spreading the virus to others? To help avoid spreading the virus to others:  · Cover your mouth with a tissue when you cough or sneeze. Then throw the tissue in the trash. · Use a disinfectant to clean things that you touch often. · Stay home if you are sick or have been exposed to the virus. Don't go to school, work, or public areas. And don't use public transportation. · If you are sick:  ? Leave your home only if you need to get medical care. But call the doctor's office first so they know you're coming. And wear a face mask, if you have one.  ? If you have a face mask, wear it whenever you're around other people. It can help stop the spread of the virus when you cough or sneeze. ? Clean and disinfect your home every day. Use household  and disinfectant wipes or sprays. Take special care to clean things that you grab with your hands. These include doorknobs, remote controls, phones, and handles on your refrigerator and microwave. And don't forget countertops, tabletops, bathrooms, and computer keyboards. When to call for help  Call 911 anytime you think you may need emergency care. For example, call if:  · You have severe trouble breathing. (You can't talk at all.)  · You have constant chest pain or pressure. · You are severely dizzy or lightheaded. · You are confused or can't think clearly. · Your face and lips have a blue color. · You pass out (lose consciousness) or are very hard to wake up. Call your doctor now if you develop symptoms such as:  · Shortness of breath. · Fever. · Cough. If you need to get care, call ahead to the doctor's office for instructions before you go. Make sure you wear a face mask, if you have one, to prevent exposing other people to the virus. Where can you get the latest information? The following health organizations are tracking and studying this virus. Their websites contain the most up-to-date information. Leta Kaur also learn what to do if you think you may have been exposed to the virus. · U.S. Centers for Disease Control and Prevention (CDC): The CDC provides updated news about the disease and travel advice. The website also tells you how to prevent the spread of infection. www.cdc.gov  · World Health Organization [facility]): WHO offers information about the virus outbreaks.  WHO also has travel advice. www.who.int  Current as of: April 1, 2020               Content Version: 12.4  © 6064-5283 [employer]. Care instructions adapted under license by your healthcare professional. If you have questions about a medical condition or this instruction, always ask your healthcare professional. [address] any warranty or liability for your use of this information.

## 2020-07-31 NOTE — OP NOTES
Operative Report  Dilation and Curettage   Patient Name: Linda Nolan  : 1985  Date of Surgery:2020  Preoperative diagnosis:  MISCARRIAGE  Postoperative diagnosis:  MISCARRIAGE  Procedure: Procedure(s):  SUCTION DILATATION AND CURETTAGE (CHOICE)  Surgeon: Brad Stephens MD  Anesthesia: MAC w paracervical block  EBL: less than 100  ccSpecimens:  ID Type Source Tests Collected by Time Destination   1 : Products of conception RACHEL Hernandezz of Conception  Chelo Lara MD 2020 1143 Pathology     Findings:enlarged uterus, c/w 12w size, anteverted  Complications:* No complications entered in OR log *    PROCEDURE:  The patient was taken to the operating room where she was placed in the lithotomy position. Exam under anesthesia was performed. She was prepped and draped in the usual fashion for vaginal surgery. Cervix was visualized with the aid of an open sided vaginal speculum and grasped with a single-tooth tenaculum. 5cc of lidocaine w epi were injected at 4 and 8 oclock. The cervix was gently dilated to 10 mm. A 10 mm suction curette was introduced to the fundus, activated, and rotated to clear uterus of tissue. A sharp curettage was performed once, until a gritty texture was noted in all quadrants of the uterus. Suction was reintroduced to remove clot and tissue. This procedure was repeated until no tissue remained, and bleeding was minimal, and the uterus was much smaller than when we started. Instruments were removed. Hemostasis confirmed. Tenaculum sight hemostasis was obtained. Patient was taken to recovery in stable condition.

## 2021-02-07 ENCOUNTER — HOSPITAL ENCOUNTER (EMERGENCY)
Age: 36
Discharge: HOME | End: 2021-02-07
Attending: EMERGENCY MEDICINE
Payer: COMMERCIAL

## 2021-02-07 ENCOUNTER — APPOINTMENT (OUTPATIENT)
Dept: ULTRASOUND IMAGING | Age: 36
End: 2021-02-07
Attending: EMERGENCY MEDICINE
Payer: COMMERCIAL

## 2021-02-07 VITALS
BODY MASS INDEX: 18.83 KG/M2 | WEIGHT: 120 LBS | OXYGEN SATURATION: 99 % | RESPIRATION RATE: 15 BRPM | HEART RATE: 59 BPM | TEMPERATURE: 97.6 F | DIASTOLIC BLOOD PRESSURE: 56 MMHG | SYSTOLIC BLOOD PRESSURE: 89 MMHG | HEIGHT: 67 IN

## 2021-02-07 DIAGNOSIS — E87.6 HYPOKALEMIA: ICD-10-CM

## 2021-02-07 DIAGNOSIS — N93.9 VAGINAL BLEEDING: Primary | ICD-10-CM

## 2021-02-07 DIAGNOSIS — R55 SYNCOPE AND COLLAPSE: ICD-10-CM

## 2021-02-07 DIAGNOSIS — O03.9 MISCARRIAGE: ICD-10-CM

## 2021-02-07 LAB
ABO + RH BLD: NORMAL
ALBUMIN SERPL-MCNC: 3.3 G/DL (ref 3.5–5)
ALBUMIN/GLOB SERPL: 1.1 {RATIO} (ref 1.1–2.2)
ALP SERPL-CCNC: 46 U/L (ref 45–117)
ALT SERPL-CCNC: 18 U/L (ref 12–78)
ANION GAP SERPL CALC-SCNC: 9 MMOL/L (ref 5–15)
AST SERPL-CCNC: 17 U/L (ref 15–37)
BASOPHILS # BLD: 0 K/UL (ref 0–0.1)
BASOPHILS NFR BLD: 0 % (ref 0–1)
BILIRUB SERPL-MCNC: 0.3 MG/DL (ref 0.2–1)
BLOOD GROUP ANTIBODIES SERPL: NORMAL
BUN SERPL-MCNC: 13 MG/DL (ref 6–20)
BUN/CREAT SERPL: 17 (ref 12–20)
CALCIUM SERPL-MCNC: 8.6 MG/DL (ref 8.5–10.1)
CHLORIDE SERPL-SCNC: 108 MMOL/L (ref 97–108)
CO2 SERPL-SCNC: 22 MMOL/L (ref 21–32)
COMMENT, HOLDF: NORMAL
CREAT SERPL-MCNC: 0.75 MG/DL (ref 0.55–1.02)
DIFFERENTIAL METHOD BLD: ABNORMAL
EOSINOPHIL # BLD: 0 K/UL (ref 0–0.4)
EOSINOPHIL NFR BLD: 0 % (ref 0–7)
ERYTHROCYTE [DISTWIDTH] IN BLOOD BY AUTOMATED COUNT: 12.5 % (ref 11.5–14.5)
GLOBULIN SER CALC-MCNC: 3.1 G/DL (ref 2–4)
GLUCOSE SERPL-MCNC: 172 MG/DL (ref 65–100)
HCT VFR BLD AUTO: 32.7 % (ref 35–47)
HGB BLD-MCNC: 11.3 G/DL (ref 11.5–16)
HGB BLD-MCNC: 9.1 G/DL (ref 11.5–16)
HGB BLD-MCNC: 9.5 G/DL (ref 11.5–16)
IMM GRANULOCYTES # BLD AUTO: 0.1 K/UL (ref 0–0.04)
IMM GRANULOCYTES NFR BLD AUTO: 0 % (ref 0–0.5)
INR PPP: 1.1 (ref 0.9–1.1)
LYMPHOCYTES # BLD: 1.5 K/UL (ref 0.8–3.5)
LYMPHOCYTES NFR BLD: 11 % (ref 12–49)
MAGNESIUM SERPL-MCNC: 2.2 MG/DL (ref 1.6–2.4)
MCH RBC QN AUTO: 31.8 PG (ref 26–34)
MCHC RBC AUTO-ENTMCNC: 34.6 G/DL (ref 30–36.5)
MCV RBC AUTO: 92.1 FL (ref 80–99)
MONOCYTES # BLD: 0.4 K/UL (ref 0–1)
MONOCYTES NFR BLD: 2 % (ref 5–13)
NEUTS SEG # BLD: 12.3 K/UL (ref 1.8–8)
NEUTS SEG NFR BLD: 87 % (ref 32–75)
NRBC # BLD: 0 K/UL (ref 0–0.01)
NRBC BLD-RTO: 0 PER 100 WBC
PLATELET # BLD AUTO: 332 K/UL (ref 150–400)
PMV BLD AUTO: 9.6 FL (ref 8.9–12.9)
POTASSIUM SERPL-SCNC: 3.3 MMOL/L (ref 3.5–5.1)
PROT SERPL-MCNC: 6.4 G/DL (ref 6.4–8.2)
PROTHROMBIN TIME: 11.4 SEC (ref 9–11.1)
RBC # BLD AUTO: 3.55 M/UL (ref 3.8–5.2)
SAMPLES BEING HELD,HOLD: NORMAL
SODIUM SERPL-SCNC: 139 MMOL/L (ref 136–145)
SPECIMEN EXP DATE BLD: NORMAL
WBC # BLD AUTO: 14.3 K/UL (ref 3.6–11)

## 2021-02-07 PROCEDURE — 96372 THER/PROPH/DIAG INJ SC/IM: CPT

## 2021-02-07 PROCEDURE — 76856 US EXAM PELVIC COMPLETE: CPT

## 2021-02-07 PROCEDURE — 86901 BLOOD TYPING SEROLOGIC RH(D): CPT

## 2021-02-07 PROCEDURE — 74011250637 HC RX REV CODE- 250/637: Performed by: EMERGENCY MEDICINE

## 2021-02-07 PROCEDURE — 85025 COMPLETE CBC W/AUTO DIFF WBC: CPT

## 2021-02-07 PROCEDURE — 83735 ASSAY OF MAGNESIUM: CPT

## 2021-02-07 PROCEDURE — 85018 HEMOGLOBIN: CPT

## 2021-02-07 PROCEDURE — 76830 TRANSVAGINAL US NON-OB: CPT

## 2021-02-07 PROCEDURE — 99284 EMERGENCY DEPT VISIT MOD MDM: CPT

## 2021-02-07 PROCEDURE — 96360 HYDRATION IV INFUSION INIT: CPT

## 2021-02-07 PROCEDURE — 85610 PROTHROMBIN TIME: CPT

## 2021-02-07 PROCEDURE — 96361 HYDRATE IV INFUSION ADD-ON: CPT

## 2021-02-07 PROCEDURE — 80053 COMPREHEN METABOLIC PANEL: CPT

## 2021-02-07 PROCEDURE — 74011250636 HC RX REV CODE- 250/636: Performed by: EMERGENCY MEDICINE

## 2021-02-07 PROCEDURE — 36415 COLL VENOUS BLD VENIPUNCTURE: CPT

## 2021-02-07 PROCEDURE — 74011250636 HC RX REV CODE- 250/636: Performed by: NURSE PRACTITIONER

## 2021-02-07 RX ORDER — POTASSIUM CHLORIDE 750 MG/1
20 TABLET, FILM COATED, EXTENDED RELEASE ORAL
Status: COMPLETED | OUTPATIENT
Start: 2021-02-07 | End: 2021-02-07

## 2021-02-07 RX ORDER — METHYLERGONOVINE MALEATE 0.2 MG/ML
0.2 INJECTION INTRAVENOUS ONCE
Status: COMPLETED | OUTPATIENT
Start: 2021-02-07 | End: 2021-02-07

## 2021-02-07 RX ADMIN — POTASSIUM CHLORIDE 20 MEQ: 750 TABLET, FILM COATED, EXTENDED RELEASE ORAL at 03:26

## 2021-02-07 RX ADMIN — METHYLERGONOVINE MALEATE 0.2 MG: 0.2 INJECTION INTRAVENOUS at 05:06

## 2021-02-07 RX ADMIN — SODIUM CHLORIDE 1000 ML: 9 INJECTION, SOLUTION INTRAVENOUS at 01:42

## 2021-02-07 RX ADMIN — SODIUM CHLORIDE 1000 ML: 9 INJECTION, SOLUTION INTRAVENOUS at 05:03

## 2021-02-07 NOTE — ED TRIAGE NOTES
\"I Had miscarriage pills today. I had a gush of blood and the fetus came out. \" states she took the meds at 10 am. Reports meds were prescribed by MD on Wednesday and she was going through more than 1 pad per hour. \"The fetus didn't have a heartbeat this Monday\". Pt pale and lethargic in triage.  Karmen Rom Dr. Derrell Rodriguez

## 2021-02-07 NOTE — ED PROVIDER NOTES
24-year-old  012 presents with heavy vaginal bleeding following passage of fetal tissue at 10 AM this morning after misoprostol and subsequent syncope this evening after vaginal bleeding. Denies head trauma,  caught patient. Complains of generalized weakness. Denies any recent illness, fever, chills, nausea, vomiting, chest pain, shortness of breath. Denies urinary complaints. Patient reports fetal demise at approximately 11 weeks was diagnosed this past Monday with an ultrasound. Misoprostol today to induce miscarriage, taken at 10 AM on 2021. Denies coronavirus exposure or concern.   Denies tobacco use, drug use, alcohol use  OBAnuradha  Primary CARE physician[place]           Past Medical History:   Diagnosis Date    Genital herpes     last outbreak over a year ago, on valtrex    Herpes simplex virus (HSV) infection     Not during pregnancy       Past Surgical History:   Procedure Laterality Date    HX OTHER SURGICAL  10/2014    wisdom teeth         Family History:   Problem Relation Age of Onset    Heart Disease Maternal Grandfather     Diabetes Paternal Grandmother         Lung    Elevated Lipids Paternal Grandmother     Heart Disease Paternal Grandfather 61       Social History     Socioeconomic History    Marital status:      Spouse name: Not on file    Number of children: Not on file    Years of education: Not on file    Highest education level: Not on file   Occupational History    Not on file   Social Needs    Financial resource strain: Not on file    Food insecurity     Worry: Not on file     Inability: Not on file   [employer] needs     Medical: Not on file     Non-medical: Not on file   Tobacco Use    Smoking status: Never Smoker    Smokeless tobacco: Never Used   Substance and Sexual Activity    Alcohol use: Not Currently     Comment: Not with pregnancy    Drug use: No    Sexual activity: Yes     Partners: Male     Birth control/protection: None Lifestyle    Physical activity     Days per week: Not on file     Minutes per session: Not on file    Stress: Not on file   Relationships    Social connections     Talks on phone: Not on file     Gets together: Not on file     Attends [employer] service: Not on file     Active member of club or organization: Not on file     Attends meetings of clubs or organizations: Not on file     Relationship status: Not on file    Intimate partner violence     Fear of current or ex partner: Not on file     Emotionally abused: Not on file     Physically abused: Not on file     Forced sexual activity: Not on file   Other Topics Concern    Not on file   Social History Narrative    Not on file         ALLERGIES: Patient has no known allergies. Review of Systems   Constitutional: Negative for chills and fever. HENT: Negative for congestion, nosebleeds and rhinorrhea. Eyes: Negative for pain and redness. Respiratory: Negative for cough and shortness of breath. Cardiovascular: Negative for chest pain and palpitations. Gastrointestinal: Negative for abdominal pain, nausea and vomiting. Genitourinary: Positive for vaginal bleeding. Negative for dysuria, frequency and vaginal pain. Musculoskeletal: Negative for myalgias. Skin: Negative for rash and wound. Neurological: Positive for syncope and weakness. Negative for seizures. Hematological: Does not bruise/bleed easily. Psychiatric/Behavioral: Negative for agitation, confusion, dysphoric mood and suicidal ideas. The patient is not nervous/anxious. Vitals:    02/07/21 0127 02/07/21 0130 02/07/21 0200 02/07/21 0300   BP:  92/61 98/62 92/60   Pulse: 77 61 63 67   Resp: 16 11 14 14   Temp:       SpO2: 100%  100% 100%   Weight:       Height:                Physical Exam  Vitals signs and nursing note reviewed. Constitutional:       Appearance: She is well-developed. HENT:      Head: Normocephalic and atraumatic.    Eyes:      Pupils: Pupils are equal, round, and reactive to light. Neck:      Musculoskeletal: Normal range of motion and neck supple. Trachea: No tracheal deviation. Cardiovascular:      Rate and Rhythm: Normal rate and regular rhythm. Heart sounds: Normal heart sounds. Pulmonary:      Effort: Pulmonary effort is normal. No respiratory distress. Breath sounds: Normal breath sounds. No stridor. No wheezing or rales. Chest:      Chest wall: No tenderness. Abdominal:      General: Bowel sounds are normal. There is no distension. Palpations: Abdomen is soft. Tenderness: There is no abdominal tenderness. There is no rebound. Genitourinary:     Cervix: Cervical bleeding present. Comments: Os closed, dark red blood. No clots noted. Musculoskeletal: Normal range of motion. General: No tenderness. Skin:     General: Skin is warm and dry. Coloration: Skin is not pale. Findings: No rash. Neurological:      Mental Status: She is alert and oriented to person, place, and time. Cranial Nerves: No cranial nerve deficit. MDM  Number of Diagnoses or Management Options  Hypokalemia  Miscarriage  Syncope and collapse  Vaginal bleeding  Diagnosis management comments: 53-year-old female  presents with complaints of heavy vaginal bleeding and passage of fetal tissue after misoprostol this morning to induce miscarriage. Patient reports fetal demise at approximately 11 weeks. Increasing generalized weakness and syncope this evening. Patient appears pale, hypotensive, afebrile, nontoxic, dark red vaginal bleeding with os closed on exam.  Plan-IV fluid hydration, CBC/CMP, discuss with gynecology as needed. Labs unremarkable       Amount and/or Complexity of Data Reviewed  Clinical lab tests: ordered and reviewed    Patient Progress  Patient progress: improved         Procedures      3:20 AM  Pt stable. Well appearing, more active. Pt reports she feels improved with more energy. Bleeding slowed per pt. Agreeable with plan for repeat hgb, ambulate patient. If stable then dc home with fu with gyn.        0420  Repeat hgb 9 from 11. 3. conulted OB, Dr. Mary Laurent. Recommends methergine IM and US r/o retained products. 0630  Repeat Hgb 9.5. US shows miscarraige in process. Discussed results with patient-agreeable with plan for dc and fu with gyn.

## 2023-11-15 NOTE — DISCHARGE INSTRUCTIONS
POSTPARTUM DISCHARGE INSTRUCTIONS       Name:  Ellis Bynum  YOB: 1985  Admission Diagnosis:  Normal labor     Discharge Diagnosis:    Problem List as of 9/24/2018  Date Reviewed: 1/7/2016          Codes Class Noted - Resolved    RESOLVED: Normal labor ICD-10-CM: O80, Z37.9  ICD-9-CM: 676  9/22/2018 - 9/24/2018        RESOLVED: Normal delivery ICD-10-CM: O80, Z37.9  ICD-9-CM: 157  1/7/2016 - 1/8/2016        RESOLVED: Normal pregnancy in third trimester ICD-10-CM: Z34.93  ICD-9-CM: V22.2  1/6/2016 - 1/8/2016            Attending Physician:  Anat Hill MD    Delivery Type:  Vaginal Childbirth: What To Expect At Home    Your Recovery: Your body will slowly heal in the next few weeks. It is easy to get too tired and overwhelmed during the first weeks after your baby is born. Changes in your hormones can shift your mood without warning. You may find it hard to meet the extra demands on your energy and time. Take it easy on yourself. Follow-up care is a key part of your treatment and safety. Be sure to make and go to all appointments, and call your doctor if you are having problems. It's also a good idea to know your test results and keep a list of the medicines you take. How can you care for yourself at home? Vaginal bleeding and cramps  · After delivery, you will have a bloody discharge from the vagina. This will turn pink within a week and then white or yellow after about 10 days. It may last for 2 to 4 weeks or longer, until the uterus has healed. Use pads instead of tampons until you stop bleeding. · Do not worry if you pass some blood clots, as long as they are smaller than a golf ball. If you have a tear or stitches in your vaginal area, change the pad at least every 4 hours to prevent soreness and infection. · You may have cramps for the first few days after childbirth. These are normal and occur as the uterus shrinks to normal size.  Take an over-the-counter pain medicine, such as acetaminophen (Tylenol), ibuprofen (Advil, Motrin), or naproxen (Aleve), for cramps. Read and follow all instructions on the label. Do not take aspirin, because it can cause more bleeding. Do not take acetaminophen (Tylenol) and other acetaminophen containing medications (i.e. Percocet) at the same time. Breast fullness  · Your breasts may overfill (engorge) in the first few days after delivery. To help milk flow and to relieve pain, warm your breasts in the shower or by using warm, moist towels before nursing. · If you are not nursing, do not put warmth on your breasts or touch your breasts. Wear a tight bra or sports bra and use ice until the fullness goes away. This usually takes 2 to 3 days. · Put ice or a cold pack on your breast after nursing to reduce swelling and pain. Put a thin cloth between the ice and your skin. Activity  · Eat a balanced diet. Do not try to lose weight by cutting calories. Keep taking your prenatal vitamins, or take a multivitamin. · Get as much rest as you can. Try to take naps when your baby sleeps during the day. · Get some exercise every day. But do not do any heavy exercise until your doctor says it is okay. · Wait until you are healed (about 4 to 6 weeks) before you have sexual intercourse. Your doctor will tell you when it is okay to have sex. · Talk to your doctor about birth control. You can get pregnant even before your period returns. Also, you can get pregnant while you are breast-feeding. Mental Health  · Many women get the \"baby blues\" during the first few days after childbirth. You may lose sleep, feel irritable, and cry easily. You may feel happy one minute and sad the next. Hormone changes are one cause of these emotional changes. Also, the demands of a new baby, along with visits from relatives or other family needs, add to a mother's stress. The \"baby blues\" often peak around the fourth day. Then they ease up in less than 2 weeks.   · If your moodiness or anxiety lasts for more than 2 weeks, or if you feel like life is not worth living, you may have postpartum depression. This is different for each mother. Some mothers with serious depression may worry intensely about their infant's well-being. Others may feel distant from their child. Some mothers might even feel that they might harm their baby. A mother may have signs of paranoia, wondering if someone is watching her. · With all the changes in your life, you may not know if you are depressed. Pregnancy sometimes causes changes in how you feel that are similar to the symptoms of depression. · Symptoms of depression include:  · Feeling sad or hopeless and losing interest in daily activities. These are the most common symptoms of depression. · Sleeping too much or not enough. · Feeling tired. You may feel as if you have no energy. · Eating too much or too little. · POSTPARTUM SUPPORT INTERNATIONAL (PSI) offers a Warm line; Chat with the Expert phone sessions; Information and Articles about Pregnancy and Postpartum Mood Disorders; Comprehensive List of Free Support Groups; Knowledgeable local coordinators who will offer support, information, and resources; Guide to Resources on [employer]; Calendar of events in the  mood disorders community; Latest News and Research; and [facility] [address] for [employer]. Remember - You are not alone; You are not to blame; With help, you will be well. 2-349-121-PPD(1340). WWW. POSTPARTUM. NET   · Writing or talking about death, such as writing suicide notes or talking about guns, knives, or pills. Keep the numbers for these national suicide hotlines: 6-620-920-TALK (1-183.432.1281) and 1-990-TBEUCXF (6-843.667.8138). If you or someone you know talks about suicide or feeling hopeless, get help right away. Constipation and Hemorrhoids  · Drink plenty of fluids, enough so that your urine is light yellow or clear like water.  If you have kidney, heart, or liver disease and have to limit fluids, talk with your doctor before you increase the amount of fluids you drink. · Eat plenty of fiber each day. Have a bran muffin or bran cereal for breakfast, and try eating a piece of fruit for a mid-afternoon snack. · For painful, itchy hemorrhoids, put ice or a cold pack on the area several times a day for 10 minutes at a time. Follow this by putting a warm compress on the area for another 10 to 20 minutes or by sitting in a shallow, warm bath. When should you call for help? Call 911 anytime you think you may need emergency care. For example, call if:  · You are thinking of hurting yourself, your baby, or anyone else. · You passed out (lost consciousness). · You have symptoms of a blood clot in your lung (called a pulmonary embolism). These may include:    · Sudden chest pain. · Trouble breathing. · Coughing up blood. Call your doctor now or seek immediate medical care if:  · You have severe vaginal bleeding. · You are soaking through a pad each hour for 2 or more hours. · Your vaginal bleeding seems to be getting heavier or is still bright red 4 days after delivery. · You are dizzy or lightheaded, or you feel like you may faint. · You are vomiting or cannot keep fluids down. · You have a fever. · You have new or more belly pain. · You pass tissue (not just blood). · Your vaginal discharge smells bad. · Your belly feels tender or full and hard. · Your breasts are continuously painful or red. · You feel sad, anxious, or hopeless for more than a few days. · You have sudden, severe pain in your belly. · You have symptoms of a blood clot in your leg (called a deep vein thrombosis),          such as:  · Pain in your calf, back of the knee, thigh, or groin. · Redness and swelling in your leg or groin. · You have symptoms of preeclampsia, such as:  · Sudden swelling of your face, hands, or feet. · New vision problems (such as dimness or blurring).   · A severe headache. · Your blood pressure is higher than it should be or rises suddenly. · You have new nausea or vomiting. Watch closely for changes in your health, and be sure to contact your doctor if you have any problems. Additional Information:  Not applicable    These are general instructions for a healthy lifestyle:    No smoking/ No tobacco products/ Avoid exposure to second hand smoke    Surgeon General's Warning:  Quitting smoking now greatly reduces serious risk to your health. Obesity, smoking, and sedentary lifestyle greatly increases your risk for illness    A healthy diet, regular physical exercise & weight monitoring are important for maintaining a healthy lifestyle    Recognize signs and symptoms of STROKE:    F-face looks uneven    A-arms unable to move or move unevenly    S-speech slurred or non-existent    T-time-call 911 as soon as signs and symptoms begin - DO NOT go       back to bed or wait to see if you get better - TIME IS BRAIN. I have had the opportunity to make my options or choices for discharge. I have received and understand these instructions. After Your Delivery (the Postpartum Period): Care Instructions  Your Care Instructions    Congratulations on the birth of your baby. Like pregnancy, the  period can be a time of excitement, agnieszka, and exhaustion. You may look at your wondrous little baby and feel happy. You may also be overwhelmed by your new sleep hours and new responsibilities. At first, babies often sleep during the days and are awake at night. They do not have a pattern or routine. They may make sudden gasps, jerk themselves awake, or look like they have crossed eyes. These are all normal, and they may even make you smile. In these first weeks after delivery, try to take good care of yourself. It may take 4 to 6 weeks to feel like yourself again, and possibly longer if you had a  birth. You will likely feel very tired for several weeks. Your days will be full of ups and downs, but lots of agnieszka as well. Follow-up care is a key part of your treatment and safety. Be sure to make and go to all appointments, and call your doctor if you are having problems. It's also a good idea to know your test results and keep a list of the medicines you take. How can you care for yourself at home? Take care of your body after delivery  · Use pads instead of tampons for the bloody flow that may last as long as 2 weeks. · Ease cramps with ibuprofen (Advil, Motrin). · Ease soreness of hemorrhoids and the area between your vagina and rectum with ice compresses or witch hazel pads. · Ease constipation by drinking lots of fluid and eating high-fiber foods. Ask your doctor about over-the-counter stool softeners. · Cleanse yourself with a gentle squeeze of warm water from a bottle instead of wiping with toilet paper. · Take a sitz bath in warm water several times a day. · Wear a good nursing bra. Ease sore and swollen breasts with warm, wet washcloths. · If you are not breastfeeding, use ice rather than heat for breast soreness. · Your period may not start for several months if you are breastfeeding. You may bleed more, and longer at first, than you did before you got pregnant. · Wait until you are healed (about 4 to 6 weeks) before you have sexual intercourse. Your doctor will tell you when it is okay to have sex. · Try not to travel with your baby for 5 or 6 weeks. If you take a long car trip, make frequent stops to walk around and stretch. Avoid exhaustion  · Rest every day. Try to nap when your baby naps. · Ask another adult to be with you for a few days after delivery. · Plan for  if you have other children. · Stay flexible so you can eat at odd hours and sleep when you need to. Both you and your baby are making new schedules. · Plan small trips to get out of the house. Change can make you feel less tired.   · Ask for help with housework, cooking, and shopping. Remind yourself that your job is to care for your baby. Know about help for postpartum depression  · \"Baby blues\" are common for the first 1 to 2 weeks after birth. You may cry or feel sad or irritable for no reason. · Rest whenever you can. Being tired makes it harder to handle your emotions. · Go for walks with your baby. · Talk to your partner, friends, and family about your feelings. · If your symptoms last for more than a few weeks, or if you feel very depressed, ask your doctor for help. · Postpartum depression can be treated. Support groups and counseling can help. Sometimes medicine can also help. Stay healthy  · Eat healthy foods so you have more energy, make good breast milk, and lose extra baby pounds. · If you breastfeed, avoid alcohol and drugs. If you quit smoking during pregnancy, try to stay smoke-free. · Start daily exercise after 4 to 6 weeks, but rest when you feel tired. · Learn exercises to tone your belly. Do Kegel exercises to regain strength in your pelvic muscles. You can do these exercises while you stand or sit. ¨ Squeeze the same muscles you would use to stop your urine. Your belly and thighs should not move. ¨ Hold the squeeze for 3 seconds, and then relax for 3 seconds. ¨ Start with 3 seconds. Then add 1 second each week until you are able to squeeze for 10 seconds. ¨ Repeat the exercise 10 to 15 times for each session. Do three or more sessions each day. · Find a class for new mothers and new babies that has an exercise time. · If you had a  birth, give yourself a bit more time before you exercise, and be careful. When should you call for help? Call 911 anytime you think you may need emergency care. For example, call if:    · You passed out (lost consciousness).    Call your doctor now or seek immediate medical care if:    · You have severe vaginal bleeding.  This means you are passing blood clots and soaking through a pad each hour for 2 or more hours.     · You are dizzy or lightheaded, or you feel like you may faint.     · You have a fever.     · You have new belly pain, or your pain gets worse.    Watch closely for changes in your health, and be sure to contact your doctor if:    · Your vaginal bleeding seems to be getting heavier.     · You have new or worse vaginal discharge.     · You feel sad, anxious, or hopeless for more than a few days.     · You do not get better as expected. Where can you learn more? Go to http://warren-malou.info/. Enter A461 in the search box to learn more about \"After Your Delivery (the Postpartum Period): Care Instructions. \"  Current as of: November 21, 2017  Content Version: 11.7  © 9640-2368 [employer]. Care instructions adapted under license by [employer] (which disclaims liability or warranty for this information). If you have questions about a medical condition or this instruction, always ask your healthcare professional. [place] [address] any warranty or liability for your use of this information. Post-Care Instructions: I reviewed with the patient in detail post-care instructions. Patient is to wear sunprotection, and avoid picking at any of the treated lesions. Pt may apply Vaseline to crusted or scabbing areas. Render Post-Care Instructions In Note?: no Detail Level: Simple Number Of Freeze-Thaw Cycles: 1 freeze-thaw cycle Consent: The patient's consent was obtained including but not limited to risks of crusting, scabbing, blistering, scarring, darker or lighter pigmentary change, recurrence, incomplete removal and infection. Show Applicator Variable?: Yes Duration Of Freeze Thaw-Cycle (Seconds): 3

## (undated) DEVICE — GARMENT,MEDLINE,DVT,INT,CALF,MED, GEN2: Brand: MEDLINE

## (undated) DEVICE — SOLUTION IV 1000ML 0.9% SOD CHL

## (undated) DEVICE — INFECTION CONTROL KIT SYS

## (undated) DEVICE — MARKER,SKIN,WI/RULER AND LABELS: Brand: MEDLINE

## (undated) DEVICE — COVER,TABLE,60X90,STERILE: Brand: MEDLINE

## (undated) DEVICE — HANDLE LT SNAP ON ULT DURABLE LENS FOR TRUMPF ALC DISPOSABLE

## (undated) DEVICE — SYR 10ML LUER LOK 1/5ML GRAD --

## (undated) DEVICE — TRAY PREP DRY W/ PREM GLV 2 APPL 6 SPNG 2 UNDPD 1 OVERWRAP

## (undated) DEVICE — TOWEL SURG W17XL27IN STD BLU COT NONFENESTRATED PREWASHED

## (undated) DEVICE — GOWN,SIRUS,NONRNF,SETINSLV,XL,20/CS: Brand: MEDLINE

## (undated) DEVICE — PAD,SANITARY,11 IN,MAXI,N-STRL,IND WRAP: Brand: MEDLINE

## (undated) DEVICE — STRAP,POSITIONING,KNEE/BODY,FOAM,4X60": Brand: MEDLINE

## (undated) DEVICE — SPONGE GZ W4XL4IN COT RADPQ HIGHLY ABSRB

## (undated) DEVICE — NEEDLE SPNL 22GA L3.5IN BLK HUB S STL REG WALL FIT STYL W/

## (undated) DEVICE — CURETTE VAC DIA10MM PLAS CRV OPN TIP RIG STR FIRM W/ FRST

## (undated) DEVICE — STERILE POLYISOPRENE POWDER-FREE SURGICAL GLOVES WITH EMOLLIENT COATING: Brand: PROTEXIS

## (undated) DEVICE — HANDLE SUCT TBNG L6FR DIA3/8IN SWVL W/ M ADPT FOR BERK PMP

## (undated) DEVICE — DRAPE,LITHOTOMY,STERILE: Brand: MEDLINE

## (undated) DEVICE — COLLECTION KT SUC TISS BERK -- GYRUS

## (undated) DEVICE — Z INACTIVE USE 2527070 DRAPE SURG W40XL44IN UNDERBUTTOCK SMS POLYPR W/ PCH BK DISP